# Patient Record
Sex: FEMALE | Race: WHITE | NOT HISPANIC OR LATINO | Employment: OTHER | URBAN - METROPOLITAN AREA
[De-identification: names, ages, dates, MRNs, and addresses within clinical notes are randomized per-mention and may not be internally consistent; named-entity substitution may affect disease eponyms.]

---

## 2023-04-18 ENCOUNTER — TELEPHONE (OUTPATIENT)
Dept: PRIMARY CARE | Facility: CLINIC | Age: 73
End: 2023-04-18
Payer: MEDICARE

## 2023-04-18 ENCOUNTER — CLINICAL SUPPORT (OUTPATIENT)
Dept: PRIMARY CARE | Facility: CLINIC | Age: 73
End: 2023-04-18
Payer: COMMERCIAL

## 2023-04-18 DIAGNOSIS — Z11.52 ENCOUNTER FOR SCREENING FOR COVID-19: Primary | ICD-10-CM

## 2023-04-18 DIAGNOSIS — Z00.00 HEALTHCARE MAINTENANCE: Primary | ICD-10-CM

## 2023-04-18 PROCEDURE — U0004 COV-19 TEST NON-CDC HGH THRU: HCPCS

## 2023-04-18 PROCEDURE — U0005 INFEC AGEN DETEC AMPLI PROBE: HCPCS

## 2023-04-18 RX ORDER — LEVOTHYROXINE SODIUM 150 UG/1
150 TABLET ORAL DAILY
COMMUNITY
End: 2023-09-07 | Stop reason: SDUPTHER

## 2023-04-18 RX ORDER — FLUTICASONE PROPIONATE 50 MCG
2 SPRAY, SUSPENSION (ML) NASAL DAILY
COMMUNITY
Start: 2022-05-15 | End: 2024-03-14 | Stop reason: WASHOUT

## 2023-04-18 RX ORDER — LISINOPRIL 10 MG/1
10 TABLET ORAL DAILY
COMMUNITY
End: 2023-09-07 | Stop reason: SDUPTHER

## 2023-04-18 RX ORDER — LANSOPRAZOLE 30 MG/1
1 CAPSULE, DELAYED RELEASE ORAL DAILY
COMMUNITY
Start: 2023-02-18 | End: 2024-03-14 | Stop reason: WASHOUT

## 2023-04-18 RX ORDER — VIT C/E/ZN/COPPR/LUTEIN/ZEAXAN 250MG-90MG
CAPSULE ORAL
COMMUNITY
Start: 2021-04-06 | End: 2024-03-14 | Stop reason: WASHOUT

## 2023-04-18 RX ORDER — ATORVASTATIN CALCIUM 20 MG/1
20 TABLET, FILM COATED ORAL DAILY
COMMUNITY
End: 2023-06-08

## 2023-04-18 NOTE — TELEPHONE ENCOUNTER
Patient communicated to medical assistant that she is to be scheduled to start chemotherapy later this week, however requires COVID-19 testing prior to initiating chemotherapy.  COVID-19 screening test ordered.  We will follow-up when resulted.

## 2023-04-19 LAB — SARS-COV-2 RESULT: NOT DETECTED

## 2023-05-01 ENCOUNTER — PATIENT OUTREACH (OUTPATIENT)
Dept: CARE COORDINATION | Facility: CLINIC | Age: 73
End: 2023-05-01
Payer: MEDICARE

## 2023-06-05 ENCOUNTER — PATIENT OUTREACH (OUTPATIENT)
Dept: CARE COORDINATION | Facility: CLINIC | Age: 73
End: 2023-06-05
Payer: MEDICARE

## 2023-06-08 DIAGNOSIS — E78.5 HYPERLIPIDEMIA, UNSPECIFIED HYPERLIPIDEMIA TYPE: Primary | ICD-10-CM

## 2023-06-08 RX ORDER — ATORVASTATIN CALCIUM 20 MG/1
TABLET, FILM COATED ORAL
Qty: 90 TABLET | Refills: 0 | Status: SHIPPED | OUTPATIENT
Start: 2023-06-08 | End: 2023-09-07 | Stop reason: SDUPTHER

## 2023-06-13 ENCOUNTER — PATIENT OUTREACH (OUTPATIENT)
Dept: CARE COORDINATION | Facility: CLINIC | Age: 73
End: 2023-06-13
Payer: MEDICARE

## 2023-09-07 ENCOUNTER — TELEPHONE (OUTPATIENT)
Dept: PRIMARY CARE | Facility: CLINIC | Age: 73
End: 2023-09-07
Payer: MEDICARE

## 2023-09-07 DIAGNOSIS — E03.9 HYPOTHYROIDISM, UNSPECIFIED TYPE: Primary | ICD-10-CM

## 2023-09-07 DIAGNOSIS — I10 HTN (HYPERTENSION), BENIGN: ICD-10-CM

## 2023-09-07 DIAGNOSIS — E78.5 HYPERLIPIDEMIA, UNSPECIFIED HYPERLIPIDEMIA TYPE: ICD-10-CM

## 2023-09-07 RX ORDER — ATORVASTATIN CALCIUM 20 MG/1
20 TABLET, FILM COATED ORAL DAILY
Qty: 90 TABLET | Refills: 1 | Status: SHIPPED | OUTPATIENT
Start: 2023-09-07 | End: 2024-03-01

## 2023-09-07 RX ORDER — CARBOXYMETHYLCELLULOSE SODIUM 10 MG/ML
2 GEL OPHTHALMIC 4 TIMES DAILY PRN
COMMUNITY
End: 2024-03-14 | Stop reason: WASHOUT

## 2023-09-07 RX ORDER — LISINOPRIL 10 MG/1
10 TABLET ORAL DAILY
Qty: 90 TABLET | Refills: 3 | Status: SHIPPED | OUTPATIENT
Start: 2023-09-07 | End: 2024-09-06

## 2023-09-07 RX ORDER — PROCHLORPERAZINE MALEATE 10 MG
TABLET ORAL
COMMUNITY
Start: 2023-05-17 | End: 2024-03-14 | Stop reason: WASHOUT

## 2023-09-07 RX ORDER — DOCUSATE SODIUM 100 MG/1
100 CAPSULE, LIQUID FILLED ORAL 2 TIMES DAILY
COMMUNITY
Start: 2023-05-04 | End: 2024-03-14 | Stop reason: WASHOUT

## 2023-09-07 RX ORDER — LEVOTHYROXINE SODIUM 150 UG/1
150 TABLET ORAL DAILY
Qty: 90 TABLET | Refills: 3 | Status: SHIPPED | OUTPATIENT
Start: 2023-09-07 | End: 2024-09-06

## 2023-09-28 ENCOUNTER — DOCUMENTATION (OUTPATIENT)
Dept: HEMATOLOGY/ONCOLOGY | Facility: HOSPITAL | Age: 73
End: 2023-09-28
Payer: MEDICARE

## 2023-10-04 NOTE — PROGRESS NOTES
"Late entry for 9/28/2023 - Unable to enter in decommissioned EMR.    Treatment Synopsis:    XRT both orbits 3/2/2023 - 3/17/2023.  Prednisone 20-60 mg 12/2022-3/2023, completed tapering on 3/31/2023.  R-MTX C1 on 4/21/2023, C2 on 5/12/2023, C3 on 6/2/2023        History of Present Illness:      ID Statement:    bre is a () day old bre        Chief Complaint: Decreased vision in the left eye.   Interval History:    Patient had no prior history of visual disturbance. In Nov 2022 she work up with swelling in the left eye. By December  the symptoms worsened, to the degree her vision in the left eye decreased significantly, and she could not move the left eye. She presented to ED on DEC 22, 2022. After MRI demonstrated \"evidence of extensive abnormal signal and enhancement infiltrating  the postseptal intraconal and extraconal left orbit\" she underwent a biopsy with Dr. Jackson Silva on DEC 24, 2022. In addition, after the biopsy she started prednisone 60 mg daily, resulting in symptomatic relief by day 3. While awaiting the pathology  results, her prednisone dose was tapered to 40 mg, then more recently to 20 mg daily. At 20 mg dose of prednisone the patient noticed the left eye symptoms (drooping, bulging) recurred to a mild degree.      The patient was seen by me on Jan 27, 2023 for Lymphoma, MALToma, involving the left orbits. Since then she has had more tests, including  MRI, labs, LP. In addition, she was referred to [facility] with Dr. Marbin Webster. While XRT is still pending, the prednisone dose was  increased back to 60 mg, which again improved the double vision of the left eye and swelling. XRT was initiated on 3/2/2023 daily, and finished on 3/17/2023. She feels well with both eye. Prednisone was tapered since then to 10 mg daily, and the last  dose was on 3/31/2023. She subsequently was admitted on 4/21 to receive high dose MTX + ritux, and tolerated it well. subsequently, she received C2 on 5/12/2023 and C3 " on 6/2/2023.    Today the patient feels well. Previously gained wt while on chemo, now losing it and feels well. No symptoms in the eyes. She has no headache. However, she experienced dizziness when changing positions, and this has also improved since completing chemo. No cough. NO fever. No nausea vomiting diarrhea. Urine output as usual.        Review of Systems:   Review of Systems:    See HPI. Elsewhere negative or WNL.       ·  System Review All other systems have been reviewed and are negative for complaint.      · Constitutional NEGATIVE: Fever, Chills, Anorexia, Weight Loss, Malaise     Comments Fatigue.      · Eyes NEGATIVE: Blurry Vision, Drainage, Diploplia, Redness, Vision Loss/ Change     Comments Previous symptoms started in Nov 2022, now resolved after prednisone and XRT.      · ENMT NEGATIVE: Nasal Discharge, Nasal Congestion, Ear Pain, Mouth Pain, Throat Pain     Comments Ear plugging.      · Respiratory NEGATIVE: Dry Cough, Productive Cough, Hemoptysis, Wheezing, Shortness of Breath      ·  Cardiology POSITIVE: Dyspnea on Exertion     NEGATIVE: Chest Pain, Orthopnea, Palpitations, Syncope      · Gastrointestinal NEGATIVE: Abdominal Pain, Constipation, Diarrhea, Nausea, Vomiting      · Genitourinary NEGATIVE: Discharge, Dysuria, Flank Pain, Frequency, Hematuria      ·  Musculoskeletal POSITIVE: Weakness     NEGATIVE: Decreased ROM, Pain, Swelling, Stiffness      ·  Neurological POSITIVE: Dizziness     NEGATIVE: Confusion, Headache, Seizures, Syncope     Comments No falls as she learned to rise up slowly.      · Psychiatric NEGATIVE: Mood Changes, Anxiety, Hallucinations, Sleep Changes, Suicidal Ideas      · Skin NEGATIVE: Mass, Pain, Pruritus, Rash, Ulcer      · Endocrine NEGATIVE: Heat Intolerance, Cold Intolerance, Sweat, Polyuria, Thirst      · Hematologic/Lymph NEGATIVE: Anemia, Bruising, Easy Bleeding, Night Sweats, Petechiae            Allergies and Intolerances:       Allergies:          codeine: Drug, Unknown, Active     Outpatient Medication Profile:  * Patient Currently Takes Medications as of 2023 19:53 documented in Structured Notes         potassium chloride 20 mEq oral tablet, extended  release: 1 tab(s) orally 2 times a day, Start Date: 2023         saliva substitutes oral solution: 15 milliliter(s) orally 4 times a day,  Start Date: 2023         prochlorperazine 10 mg oral tablet: 1 tab(s) orally every 6 hours, As  Needed - for nausea, Start Date: 17-May-2023         ondansetron 8 mg oral tablet: 1 tab(s) orally every 8 hours, As Needed  - for nausea, Start Date: 17-May-2023         Vitamin D3 25 mcg (1000 intl units) oral tablet: 1 tab(s) orally once  a day         lisinopril 10 mg oral tablet: 1 tab(s) orally once a day         atorvastatin 20 mg oral tablet: 1 tab(s) orally once a day         levothyroxine 150 mcg (0.15 mg) oral capsule: 1 tab(s) orally once a  day         ocular lubricant ophthalmic solution: 1 drop(s) to each affected eye  4 times a day, As Needed - for dry eyes         polyethylene glycol 3350 oral powder for reconstitution: 17 gram(s) orally  once a day, As Needed -for constipation              Medical History:         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, Status: Active         Hyperlipidemia: ICD-10: E78.5, Status: Active       Surg History:         Cataract: ICD-10: H26.9, Status: Active         History of partial thyroidectomy: ICD-10: E89.0, Status:  Active         History of knee surgery: ICD-10: Z98.890, Status: Active         History of  section: ICD-10: Z98.891, Status: Active        Social History:   Social Substance History:  ·  Smoking Status former smoker (1)            Performance:   ECOG Performance Status: 0 Fully Active         Vitals and Measurements:   Vitals: Temp: 36.8  HR: 104  RR: 18  BP: 118/73   SPO2%:  98   Measurements: HT(cm): 161  WT(kg): 92.7  BSA: 2.03   BMI:  35.7       Physical Exam:      Constitutional: Well developed, awake/alert/oriented  x3, no distress, alert and cooperative   Eyes: PERRL, EOMI, clear sclera   ENMT: mucous membranes moist, no apparent injury,  no lesions seen   Head/Neck: Neck supple, no apparent injury, thyroid  without mass or tenderness, No JVD, trachea midline, no bruits   Respiratory/Thorax: Patent airways, CTAB, normal  breath sounds with good chest expansion, thorax symmetric   Cardiovascular: Regular, rate and rhythm, no murmurs,  2+ equal pulses of the extremities, normal S 1and S 2   Gastrointestinal: Nondistended, soft, non-tender,  no rebound tenderness or guarding, no masses palpable, no organomegaly, +BS, no bruits   Genitourinary: No Discharge, vesicles or other abnormalities   Musculoskeletal: ROM intact, no joint swelling, normal  strength   Extremities: normal extremities, no cyanosis edema,  contusions or wounds, no clubbing   Neurological: alert and oriented x3, intact senses,  motor, response and reflexes, normal strength   Breast: No masses, tenderness, no discharge or discoloration   Lymphatic: No significant lymphadenopathy   Psychological: Appropriate mood and behavior   Skin: Warm and dry, no lesions, no rashes         Lab Results:     ·  Results        CBC date/time       WBC     HGB     HCT     PLT     Neut      09-Jun-2023 15:17   7.2     13.2    40.0    200     4.75     BMP date/time       NA              K               CL              CO2             BUN             CREAT             09-Jun-2023 15:17   136             4.0             103             N/A             21              1.01     Hepatic date/time   T Pro   T Bili  AST     ALT     ALKP    ALB       02-Jun-2023 16:22   6.1(L)  0.8     N/A     19      140(H)   3.9     LDH date/time       LDH     09-Jun-2023 15:17   N/A     Radiology Result:     ·  Results     FINAL REPORT  Interpreted by: MISTI VALERIO FREDERICK, MD  06/26/23 11:49  Facility: [facility]  [place]     MRN: [id number]  Patient Name: ADARSH CHAIDEZ     STUDY:  MR ORBITS WO/W;  6/26/2023 10:35 am     INDICATION:  MALT; progression scan  C88.4: Mucosa-associated lymphoid tissue  (MALT) lymphoma of orbit.     COMPARISON:  December 2022.     ACCESSION NUMBER(S):  [id number]     ORDERING CLINICIAN:  JASON GURROLA     TECHNIQUE:  The orbits and paranasal sinuses were studied in the sagittal, axial  and coronal plane utilizing T1 and T2 weighted images both before and  following intravenous injection of contrast     FINDINGS:  * Persistent but decreased enhancement within the intraconal fat of  the left orbit  *Persistent slight thickening and enhancement of the rectus muscles  *The lacrimal glands are normal  *Soft tissue thickening and enhancement in the left pterygoid  palatine fissure have decreased in the interval since the previous  exam  *Slight thickening and enhancement remain in the anterior portion of  the left cavernous sinus  *The right orbit remains normal  *Increased mucoperiosteal thickening and enhancement in the ethmoid  air cells bilaterally  *No abnormal meningeal enhancement     IMPRESSION:  * Persistent but decreased soft tissue thickening and enhancement in  the left orbit with involvement of the muscle cone and extraocular  muscles as well as the orbital apex and left cavernous sinus.  * THIS EXAMINATION WAS INTERPRETED AT [facility]     Transcribed By: Interface, User  Electronically Signed By: MISTI VALERIO FREDERICK   06/26/23 11:49                    2023/2/9 MRI spine:  Within the L2 and L4 vertebral bodies there are areas of T1 and T2  hypointensity with increased STIR signal and enhancement on  postcontrast images worrisome for a bone marrow replacing lesion such  as a metastatic lesions. There is no loss of vertebral body height,  osseous retropulsion into the spinal canal or spinal canal stenosis.     Area of similar signal abnormality within the inferior left sacrum  and left iliac  bone also worrisome for a bone marrow replacing lesion  such as a metastatic lesions.     Mild degenerative changes without significant spinal canal or neural  foraminal stenosis.     No abnormal signal or enhancement within the distal cord or nerve  roots.        2022/12/23 MRI of orbits  IMPRESSION:  There is again evidence of extensive abnormal signal and enhancement  infiltrating the postseptal intraconal and extraconal left orbit with  posterior intracranial extension through the left orbital fissure  into the region of the left cavernous sinus as well as caudal  extension through left pterygopalatine fossa with more ill-defined  abnormal signal enhancement extending laterally into the left retro  maxillary fat pad. There is asymmetric left-sided exophthalmos with  straightening of the left optic nerve and globe tenting. There is  extrinsic compression upon the left optic nerve/optic nerve sheath.  There is abnormal enhancement along the left optic disc. There is  asymmetric signal abnormality and enhancement infiltrating the left  preseptal periorbital soft tissues. There is additional abnormal  signal and enhancement infiltrating the right pterygopalatine fossa  with cranial extension into the right orbital apex along the inferior  right orbital fissure as well as along the margins of the postseptal  right orbit most pronounced inferior laterally. There is  posterolateral extension in an extracranial location infiltrating and  surrounding the right  space as well as infiltrating the  right retro maxillary fat pad. There is additional abnormal  inhomogeneous bone marrow signal infiltrating the central skull base  including the pterygoid bones right greater than left, clivus, and  right petrous bone. There is additional abnormal intracranial  enhancement surrounding the right petrous apex and partial effacing  Meckel's cave on the right with asymmetric abnormal enhancement  extending inferior  laterally in a right parasellar location through  the region of the right foramen ovale. The abnormal extra-axial  enhancement surrounding the right petrous apex extends posteriorly  and caudally into the right internal auditory canal. While  nonspecific, the constellation of MRI findings is concerning for a  extensive infiltrative neoplastic process such as lymphoma other  etiology such as a underlying granulomatous process or infectious or  noninfectious inflammatory process can not be completely excluded.     There is a discrete partially calcified extra-axial dural-based  lesion adjacent to the lateral left occipital lobe measuring  approximately 9 mm by 4 mm in transaxial dimensions as seen on  high-resolution axial post gadolinium T1 weighted images through the  orbits slice 7 of 26 by 11 mm in craniocaudal dimension as seen on  coronal post gadolinium volumetric T1 slice 53 of 200. While  nonspecific, possible considerations would include a partially  calcified meningioma among others.              Pathology Results:     ·  Results     A.  LEFT ORBITAL MASS, BIOPSY:  -- FIBROADIPOSE TISSUE WITH SLIGHTLY ATYPICAL LYMPHOID INFILTRATE, SEE NOTE.     NOTE: there is no increase in IgG4-positive plasma cells. No granulomas are  identified. Molecular study for B-cell clonality is ordered to evaluate for the  possibility of a B-cell lymphoproliferative disorder, and the results reported  in an addendum. POSITIVE. CLONAL POPULATION DETECTED in the tested sample.  Addendum Diagnosis  A.  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 Clinical correlation is suggested.     This case was discussed at [facility]ematopathology consensus conference on  01/18/2023, and the overall diagnosis  was concurred.     CSF on 2/13/2023: no evidence of lymphoma     BM on 2/13/2023: no evidence of lymphoma     Assessment and Plan:      Assessment and Plan:   Assessment:    #Lymphoma, MALToma  Thoroughly reviewed all available data at disease presentation in Feb 2023. Reviewed independently with Neuro-radiology. Tumor involvement  around the orbital space is quite extensive. The dura is possible also related. There is therefore a risk of CNS involvement / relapse. The stage is incomplete, and will require more body CT, spine MRI, bone marrow biopsy, and CSF analysis. However, these  results may be compromised by the treatment using steroid since end of Dec 2022.   The immediate plan is to prevent loss of vision in the left eye. In light of mild relapse of symptoms in the left eye, will increase prednisone back to 60 mg, while awaiting [facility] input on XRT of both orbits.   Other staging work up include: body CT, spine MRI, bone marrow biopsy, and CSF analysis. The results  do not show evidence of CSF or leptomeningeal involvement. MRI of the spine suggest possible bone marrow involvement. Bone marrow biopsy did not show evidence of lymphoma     Systemic therapy  is indicated due to significant involvement of the both orbits and surrounding tissues. Details described below.   The response to XRT and R-MTX has been encouraging. Results of Persistent but decreased soft tissue thickening and enhancement in the left orbit and surrounding tissues is likely indicative  of treatment response, and reaction and repair. However, persistent lymphoma cannot be ruled out. Unfortunately, neither MRI and PET/CT will be very sensitive to detect it. Will need to monitor closely, will likely have MRI orbits q3mon for the first 12 months.     -Results of MRI orbits shows: Findings consistent with left orbital lymphoma are slightly decreased from the prior study dated 06/26/2023. Question focal increased signal within the  intracanalicular segment of the left optic nerve versus volume averaging. Attention on follow-up exams.  -The above is more consistent with post-treatment effects. Will continue to monitor closely. MRI brain in 3 mon, then MRI orbits in another 3 mon.     #Treatment   Will benefit from high dose methotrexate and rituximab. The goal is to prevent CNS relapse and local (orbital) relapse.   Discussed the AEs of both drugs, which are numerous. In particular, discussed SAEs such as PML due to IRAIS viral encephalitis, mutositis, TAI, or even rarely death.   Patient understood the benefits and risks of the treatment and consented.   She is non-reactive to HBV core Ab. Therefore the risk of HBV reactivation is low.    She subsequently was admitted on 4/21 and 5/12 to receive high dose MTX + ritux, and tolerated both cycles well. C3 on 6/2 is the last planned chemo. Treatment is completed.      Plan:    RTC with JOE/MD in 3 mon.   MRI brain in 3 mon, then MRI orbits in another 3 mon.

## 2023-12-20 PROBLEM — H61.21 IMPACTED CERUMEN OF RIGHT EAR: Status: ACTIVE | Noted: 2023-12-20

## 2023-12-20 PROBLEM — H90.A31 MIXED CONDUCTIVE AND SENSORINEURAL HEARING LOSS, UNILATERAL, RIGHT EAR WITH RESTRICTED HEARING ON THE CONTRALATERAL SIDE: Status: ACTIVE | Noted: 2023-12-20

## 2023-12-20 PROBLEM — T45.1X5A CHEMOTHERAPY INDUCED NAUSEA AND VOMITING: Status: ACTIVE | Noted: 2023-12-20

## 2023-12-20 PROBLEM — H40.059 OCULAR HYPERTENSION: Status: ACTIVE | Noted: 2023-12-20

## 2023-12-20 PROBLEM — H69.91 DYSFUNCTION OF RIGHT EUSTACHIAN TUBE: Status: ACTIVE | Noted: 2023-12-20

## 2023-12-20 PROBLEM — E03.9 HYPOTHYROID: Status: ACTIVE | Noted: 2023-12-20

## 2023-12-20 PROBLEM — H90.3 SENSORINEURAL HEARING LOSS (SNHL) OF BOTH EARS: Status: ACTIVE | Noted: 2023-12-20
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
## 2023-12-20 PROBLEM — C85.99: Status: ACTIVE | Noted: 2023-12-20

## 2023-12-20 PROBLEM — E78.5 HYPERLIPIDEMIA: Status: ACTIVE | Noted: 2023-12-20

## 2023-12-20 PROBLEM — E04.2 MULTINODULAR GOITER: Status: ACTIVE | Noted: 2023-12-20

## 2023-12-20 PROBLEM — R60.9 PAROTID SWELLING: Status: ACTIVE | Noted: 2023-12-20

## 2023-12-20 PROBLEM — H65.91 RIGHT OTITIS MEDIA WITH EFFUSION: Status: ACTIVE | Noted: 2023-12-20

## 2023-12-20 PROBLEM — R11.2 CHEMOTHERAPY INDUCED NAUSEA AND VOMITING: Status: ACTIVE | Noted: 2023-12-20

## 2023-12-20 PROBLEM — H57.12 PAIN AROUND LEFT EYE: Status: ACTIVE | Noted: 2023-12-20

## 2023-12-20 PROBLEM — I10 HYPERTENSION: Status: ACTIVE | Noted: 2023-12-20

## 2023-12-20 PROBLEM — H05.20 PROPTOSIS: Status: ACTIVE | Noted: 2023-12-20

## 2023-12-20 PROBLEM — J30.2 SEASONAL ALLERGIC RHINITIS: Status: ACTIVE | Noted: 2023-12-20

## 2023-12-20 PROBLEM — M54.2 NECK PAIN: Status: ACTIVE | Noted: 2023-12-20

## 2023-12-20 PROBLEM — R29.6 FALLS: Status: ACTIVE | Noted: 2023-12-20

## 2023-12-20 PROBLEM — H91.90 HEARING LOSS: Status: ACTIVE | Noted: 2023-12-20

## 2023-12-20 RX ORDER — POTASSIUM CHLORIDE 750 MG/1
TABLET, EXTENDED RELEASE ORAL
COMMUNITY
Start: 2023-05-17 | End: 2024-03-14 | Stop reason: WASHOUT

## 2023-12-20 RX ORDER — POLYETHYLENE GLYCOL 3350 17 G/17G
17 POWDER, FOR SOLUTION ORAL
COMMUNITY
End: 2024-03-14 | Stop reason: WASHOUT

## 2023-12-20 RX ORDER — POTASSIUM CHLORIDE 20 MEQ/1
TABLET, EXTENDED RELEASE ORAL
COMMUNITY
Start: 2023-06-07 | End: 2024-03-14 | Stop reason: WASHOUT

## 2023-12-20 RX ORDER — LEUCOVORIN CALCIUM 25 MG/1
TABLET ORAL
COMMUNITY
Start: 2023-05-17 | End: 2024-03-14 | Stop reason: WASHOUT

## 2023-12-22 ENCOUNTER — LAB (OUTPATIENT)
Dept: LAB | Facility: LAB | Age: 73
End: 2023-12-22
Payer: MEDICARE

## 2023-12-22 ENCOUNTER — HOSPITAL ENCOUNTER (OUTPATIENT)
Dept: RADIOLOGY | Facility: HOSPITAL | Age: 73
Discharge: HOME | End: 2023-12-22
Payer: MEDICARE

## 2023-12-22 DIAGNOSIS — C85.99 NON-HODGKIN LYMPHOMA, UNSPECIFIED, EXTRANODAL AND SOLID ORGAN SITES (MULTI): ICD-10-CM

## 2023-12-22 DIAGNOSIS — C85.99 NON-HODGKIN LYMPHOMA, UNSPECIFIED, EXTRANODAL AND SOLID ORGAN SITES (MULTI): Primary | ICD-10-CM

## 2023-12-22 LAB
ALBUMIN SERPL BCP-MCNC: 4.1 G/DL (ref 3.4–5)
ALP SERPL-CCNC: 118 U/L (ref 33–136)
ALT SERPL W P-5'-P-CCNC: 10 U/L (ref 7–45)
ANION GAP SERPL CALC-SCNC: 11 MMOL/L (ref 10–20)
AST SERPL W P-5'-P-CCNC: 12 U/L (ref 9–39)
BASOPHILS # BLD AUTO: 0.03 X10*3/UL (ref 0–0.1)
BASOPHILS NFR BLD AUTO: 0.4 %
BILIRUB SERPL-MCNC: 0.5 MG/DL (ref 0–1.2)
BUN SERPL-MCNC: 19 MG/DL (ref 6–23)
CALCIUM SERPL-MCNC: 8.9 MG/DL (ref 8.6–10.3)
CHLORIDE SERPL-SCNC: 105 MMOL/L (ref 98–107)
CO2 SERPL-SCNC: 28 MMOL/L (ref 21–32)
CREAT SERPL-MCNC: 0.76 MG/DL (ref 0.5–1.05)
EOSINOPHIL # BLD AUTO: 0.38 X10*3/UL (ref 0–0.4)
EOSINOPHIL NFR BLD AUTO: 5.2 %
ERYTHROCYTE [DISTWIDTH] IN BLOOD BY AUTOMATED COUNT: 13.7 % (ref 11.5–14.5)
GFR SERPL CREATININE-BSD FRML MDRD: 82 ML/MIN/1.73M*2
GLUCOSE SERPL-MCNC: 120 MG/DL (ref 74–99)
HCT VFR BLD AUTO: 41.3 % (ref 36–46)
HGB BLD-MCNC: 13.2 G/DL (ref 12–16)
IMM GRANULOCYTES # BLD AUTO: 0.02 X10*3/UL (ref 0–0.5)
IMM GRANULOCYTES NFR BLD AUTO: 0.3 % (ref 0–0.9)
LDH SERPL L TO P-CCNC: 136 U/L (ref 84–246)
LYMPHOCYTES # BLD AUTO: 1.44 X10*3/UL (ref 0.8–3)
LYMPHOCYTES NFR BLD AUTO: 19.8 %
MAGNESIUM SERPL-MCNC: 1.93 MG/DL (ref 1.6–2.4)
MCH RBC QN AUTO: 27.4 PG (ref 26–34)
MCHC RBC AUTO-ENTMCNC: 32 G/DL (ref 32–36)
MCV RBC AUTO: 86 FL (ref 80–100)
MONOCYTES # BLD AUTO: 0.56 X10*3/UL (ref 0.05–0.8)
MONOCYTES NFR BLD AUTO: 7.7 %
NEUTROPHILS # BLD AUTO: 4.84 X10*3/UL (ref 1.6–5.5)
NEUTROPHILS NFR BLD AUTO: 66.6 %
NRBC BLD-RTO: 0 /100 WBCS (ref 0–0)
PLATELET # BLD AUTO: 265 X10*3/UL (ref 150–450)
POTASSIUM SERPL-SCNC: 3.9 MMOL/L (ref 3.5–5.3)
PROT SERPL-MCNC: 6.3 G/DL (ref 6.4–8.2)
RBC # BLD AUTO: 4.81 X10*6/UL (ref 4–5.2)
SODIUM SERPL-SCNC: 140 MMOL/L (ref 136–145)
URATE SERPL-MCNC: 5.4 MG/DL (ref 2.3–6.7)
WBC # BLD AUTO: 7.3 X10*3/UL (ref 4.4–11.3)

## 2023-12-22 PROCEDURE — 36415 COLL VENOUS BLD VENIPUNCTURE: CPT

## 2023-12-22 PROCEDURE — 83735 ASSAY OF MAGNESIUM: CPT

## 2023-12-22 PROCEDURE — 83615 LACTATE (LD) (LDH) ENZYME: CPT

## 2023-12-22 PROCEDURE — A9575 INJ GADOTERATE MEGLUMI 0.1ML: HCPCS | Performed by: INTERNAL MEDICINE

## 2023-12-22 PROCEDURE — 70553 MRI BRAIN STEM W/O & W/DYE: CPT

## 2023-12-22 PROCEDURE — 80053 COMPREHEN METABOLIC PANEL: CPT

## 2023-12-22 PROCEDURE — 85025 COMPLETE CBC W/AUTO DIFF WBC: CPT

## 2023-12-22 PROCEDURE — 84550 ASSAY OF BLOOD/URIC ACID: CPT

## 2023-12-22 PROCEDURE — 70553 MRI BRAIN STEM W/O & W/DYE: CPT | Performed by: RADIOLOGY

## 2023-12-22 PROCEDURE — 2500000004 HC RX 250 GENERAL PHARMACY W/ HCPCS (ALT 636 FOR OP/ED): Performed by: INTERNAL MEDICINE

## 2023-12-22 RX ORDER — GADOTERATE MEGLUMINE 376.9 MG/ML
17 INJECTION INTRAVENOUS
Status: COMPLETED | OUTPATIENT
Start: 2023-12-22 | End: 2023-12-22

## 2023-12-22 RX ADMIN — GADOTERATE MEGLUMINE 17 ML: 376.9 INJECTION INTRAVENOUS at 11:56

## 2023-12-28 ENCOUNTER — TELEMEDICINE (OUTPATIENT)
Dept: HEMATOLOGY/ONCOLOGY | Facility: HOSPITAL | Age: 73
End: 2023-12-28
Payer: MEDICARE

## 2023-12-28 DIAGNOSIS — C85.99 ORBITAL LYMPHOMA (MULTI): Primary | ICD-10-CM

## 2023-12-28 PROCEDURE — 99214 OFFICE O/P EST MOD 30 MIN: CPT | Performed by: NURSE PRACTITIONER

## 2024-01-09 ENCOUNTER — TELEPHONE (OUTPATIENT)
Dept: RADIATION ONCOLOGY | Facility: HOSPITAL | Age: 74
End: 2024-01-09
Payer: MEDICARE

## 2024-01-09 NOTE — TELEPHONE ENCOUNTER
Called pt. to remind of appointment on 1/10/2024 at 2:00 pm with Dr. Webster. Pt answered and confirmed  appointment.

## 2024-01-10 ENCOUNTER — HOSPITAL ENCOUNTER (OUTPATIENT)
Dept: RADIATION ONCOLOGY | Facility: HOSPITAL | Age: 74
Setting detail: RADIATION/ONCOLOGY SERIES
Discharge: HOME | End: 2024-01-10
Payer: MEDICARE

## 2024-01-10 VITALS
OXYGEN SATURATION: 99 % | DIASTOLIC BLOOD PRESSURE: 82 MMHG | HEART RATE: 93 BPM | WEIGHT: 182.54 LBS | HEIGHT: 63 IN | RESPIRATION RATE: 18 BRPM | BODY MASS INDEX: 32.34 KG/M2 | SYSTOLIC BLOOD PRESSURE: 131 MMHG | TEMPERATURE: 96.8 F

## 2024-01-10 DIAGNOSIS — C85.99 ORBITAL LYMPHOMA (MULTI): Primary | ICD-10-CM

## 2024-01-10 PROCEDURE — 99213 OFFICE O/P EST LOW 20 MIN: CPT | Performed by: STUDENT IN AN ORGANIZED HEALTH CARE EDUCATION/TRAINING PROGRAM

## 2024-01-10 ASSESSMENT — ENCOUNTER SYMPTOMS
OCCASIONAL FEELINGS OF UNSTEADINESS: 0
LOSS OF SENSATION IN FEET: 0
DEPRESSION: 0

## 2024-01-10 ASSESSMENT — COLUMBIA-SUICIDE SEVERITY RATING SCALE - C-SSRS
2. HAVE YOU ACTUALLY HAD ANY THOUGHTS OF KILLING YOURSELF?: NO
6. HAVE YOU EVER DONE ANYTHING, STARTED TO DO ANYTHING, OR PREPARED TO DO ANYTHING TO END YOUR LIFE?: NO
1. IN THE PAST MONTH, HAVE YOU WISHED YOU WERE DEAD OR WISHED YOU COULD GO TO SLEEP AND NOT WAKE UP?: NO

## 2024-01-10 ASSESSMENT — PATIENT HEALTH QUESTIONNAIRE - PHQ9
2. FEELING DOWN, DEPRESSED OR HOPELESS: NOT AT ALL
1. LITTLE INTEREST OR PLEASURE IN DOING THINGS: NOT AT ALL
SUM OF ALL RESPONSES TO PHQ9 QUESTIONS 1 AND 2: 0

## 2024-01-11 ASSESSMENT — ENCOUNTER SYMPTOMS
HEADACHES: 0
NUMBNESS: 0
RESPIRATORY NEGATIVE: 1
GASTROINTESTINAL NEGATIVE: 1
MUSCULOSKELETAL NEGATIVE: 1
CARDIOVASCULAR NEGATIVE: 1
CONSTITUTIONAL NEGATIVE: 1
DIZZINESS: 0
SPEECH DIFFICULTY: 0
PSYCHIATRIC NEGATIVE: 1

## 2024-01-11 NOTE — PROGRESS NOTES
Radiation Oncology Follow-Up    Patient Name:  Ирина Wesley  MRN:  [id number]  :  1950    Referring Provider: No ref. provider found  Primary Care Provider: Jonny Hahn DO  Care Team: Patient Care Team:  Jonny Hahn DO as PCP - General  Malachi Pate DO as PCP - [employer] PCP  Tonya Tolbert MD PhD as Consulting Physician (Hematology and Oncology)    Date of Service: 1/10/2024     SUBJECTIVE  History of Present Illness:   Ирина Wesley is a 73 y.o. female with MALToma of the orbits s/p high dose prednisone, radiation therapy to orbits and base of skull completed 3/2023, high dose methotrexate, and rituximab completed 2023. She presents for routine follow-up.     The patient endorses that her vision has improved greatly, is less blurry, and has returned to close to her baseline. However, she endorses persistent hearing loss that has not improved or worsened since completing radiation therapy. She denies new visual disturbances, double vision, numbness, or motor deficits.    Treatment Rendered:   Location : Orbits+CONSTANCE  Dates : 3/2/23 - 3/17/23  Number of Treatments : 12  Dose : 2400 cGy  Modality : Protons    Review of Systems:   Review of Systems   Constitutional: Negative.    HENT:   Positive for hearing loss.    Eyes:         Endorses improvement in vision bilaterally  Denies double vision   Respiratory: Negative.     Cardiovascular: Negative.    Gastrointestinal: Negative.    Musculoskeletal: Negative.    Neurological:  Negative for dizziness, headaches, numbness and speech difficulty.   Psychiatric/Behavioral: Negative.       The patient's current pain level was assessed.  They report currently having a pain of 0 out of 10.      Performance Status:   The Karnofsky performance scale today is 80, Normal activity with effort; some signs or symptoms of disease (ECOG equivalent 1).        OBJECTIVE  Vital Signs:  /82   Pulse 93   Temp 36 °C (96.8 °F) (Temporal)   Resp  "18   Ht 1.6 m (5' 3\")   Wt 82.8 kg (182 lb 8.7 oz)   SpO2 99%   BMI 32.34 kg/m²    Physical Exam:  Physical Exam  Constitutional:       General: She is not in acute distress.  HENT:      Ears:      Comments: Hearing grossly intact to voice; cannot hear finger rub     Mouth/Throat:      Mouth: Mucous membranes are moist.   Cardiovascular:      Rate and Rhythm: Normal rate.   Pulmonary:      Effort: Pulmonary effort is normal. No respiratory distress.   Abdominal:      General: Abdomen is flat.   Musculoskeletal:         General: Normal range of motion.   Skin:     General: Skin is warm and dry.   Neurological:      Mental Status: She is alert.      Motor: No weakness.      Coordination: Coordination normal.      Gait: Gait normal.      Comments: CN II: endorses some persistent blurry vision of left eye  CN II, IV, V, VI, VII intact  CN VIII: endorses mild hearing loss, cannot  hear finger rub  CN IX, XI, XII intact       MR BRAIN W AND WO IV CONTRAST; ;  12/22/2023 12:10 pm    COMPARISON:  MRI orbits from 09/18/2023. MRI brain from 04/06/2023.    FINDINGS:  Current study is not tailored to assess the orbits, noting this,  there is enhancing tissue located within the left intraconal fat  which is most consistent with lymphoma and/or treatment related  changes.      There is no acute intracranial hemorrhage or infarct. There is no  abnormal extra-axial fluid collection.      The ventricles, sulci, and basilar cisterns are normal in size,  shape, and configuration.      Stable 8 mm meningioma along the left temporal occipital lobes with  stable mass effect on the adjacent brain parenchyma but no  parenchymal signal abnormality.      There are few scattered foci of T2 hyperintensity within the cerebral  hemispheric white matter which are probably a sequela of chronic  small vessel ischemic changes, unchanged.      No new masses or lesions are seen within the intracranial compartment.      There is no striking signal " abnormality within the visualized osseous  structures to suggest lymphomatous involvement.      There is a small air-fluid level and mild mucosal thickening located  within the left maxillary sinus. There is mild mucosal thickening  located within the ethmoid air cells, frontal sinuses, and right  maxillary sinus. The mastoid air cells are clear.      IMPRESSION:  1. No striking evidence to suggest intracranial lymphomatous  involvement.      2. Stable meningioma along the left temporal occipital lobes with  associated mass effect.      3. Additional chronic intracranial findings as above.     ASSESSMENT:  Ирина Wesley is a 73 y.o. female with MALToma of the orbits s/p high dose prednisone, radiation therapy to orbits and base of skull completed 3/2023, high dose methotrexate, and rituximab completed 6/2023. She notes improvement in her visual deficits but endorses persistent hearing loss. Recent imaging suboptimal for assessment of orbits but does not show evidence of recurrence.    PLAN:  Follow-up in 3 months to assess MR orbits.    NCCN Guidelines were applicable to guide this patients treatment plan.  The patient was assessed and plan discussed with the attending radiation oncologist Dr. Webster.    Ute Lambert MD  PGY-2 Radiation Oncology Resident  On-call pager [phone]  Available on Epic Secure Chat

## 2024-01-20 NOTE — ADDENDUM NOTE
Encounter addended by: Marbin Webster MD on: 1/20/2024 4:28 PM   Actions taken: Level of Service modified, Clinical Note Signed

## 2024-02-29 DIAGNOSIS — E78.5 HYPERLIPIDEMIA, UNSPECIFIED HYPERLIPIDEMIA TYPE: ICD-10-CM

## 2024-03-01 RX ORDER — ATORVASTATIN CALCIUM 20 MG/1
20 TABLET, FILM COATED ORAL DAILY
Qty: 90 TABLET | Refills: 0 | Status: SHIPPED | OUTPATIENT
Start: 2024-03-01 | End: 2024-03-01

## 2024-03-03 DIAGNOSIS — E78.5 HYPERLIPIDEMIA, UNSPECIFIED HYPERLIPIDEMIA TYPE: ICD-10-CM

## 2024-03-04 RX ORDER — ATORVASTATIN CALCIUM 20 MG/1
20 TABLET, FILM COATED ORAL DAILY
Qty: 90 TABLET | Refills: 0 | Status: SHIPPED | OUTPATIENT
Start: 2024-03-04 | End: 2024-05-02 | Stop reason: DRUGHIGH

## 2024-03-11 ENCOUNTER — HOSPITAL ENCOUNTER (OUTPATIENT)
Dept: RADIOLOGY | Facility: HOSPITAL | Age: 74
Discharge: HOME | End: 2024-03-11
Payer: MEDICARE

## 2024-03-11 DIAGNOSIS — C85.99 NON-HODGKIN LYMPHOMA, UNSPECIFIED, EXTRANODAL AND SOLID ORGAN SITES (MULTI): ICD-10-CM

## 2024-03-11 PROCEDURE — A9575 INJ GADOTERATE MEGLUMI 0.1ML: HCPCS | Performed by: INTERNAL MEDICINE

## 2024-03-11 PROCEDURE — 70543 MRI ORBT/FAC/NCK W/O &W/DYE: CPT | Performed by: RADIOLOGY

## 2024-03-11 PROCEDURE — 2550000001 HC RX 255 CONTRASTS: Performed by: INTERNAL MEDICINE

## 2024-03-11 PROCEDURE — 70543 MRI ORBT/FAC/NCK W/O &W/DYE: CPT

## 2024-03-11 RX ORDER — GADOTERATE MEGLUMINE 376.9 MG/ML
17 INJECTION INTRAVENOUS
Status: COMPLETED | OUTPATIENT
Start: 2024-03-11 | End: 2024-03-11

## 2024-03-11 RX ADMIN — GADOTERATE MEGLUMINE 17 ML: 376.9 INJECTION INTRAVENOUS at 11:18

## 2024-03-14 ENCOUNTER — OFFICE VISIT (OUTPATIENT)
Dept: HEMATOLOGY/ONCOLOGY | Facility: HOSPITAL | Age: 74
End: 2024-03-14
Payer: MEDICARE

## 2024-03-14 VITALS
TEMPERATURE: 95.7 F | OXYGEN SATURATION: 98 % | HEART RATE: 95 BPM | RESPIRATION RATE: 16 BRPM | SYSTOLIC BLOOD PRESSURE: 125 MMHG | WEIGHT: 187.3 LBS | HEIGHT: 63 IN | DIASTOLIC BLOOD PRESSURE: 61 MMHG | BODY MASS INDEX: 33.19 KG/M2

## 2024-03-14 DIAGNOSIS — C85.89 MARGINAL ZONE LYMPHOMA OF EXTRANODAL AND SOLID ORGAN SITES (MULTI): Primary | ICD-10-CM

## 2024-03-14 PROCEDURE — 3078F DIAST BP <80 MM HG: CPT | Performed by: INTERNAL MEDICINE

## 2024-03-14 PROCEDURE — 3074F SYST BP LT 130 MM HG: CPT | Performed by: INTERNAL MEDICINE

## 2024-03-14 PROCEDURE — 1036F TOBACCO NON-USER: CPT | Performed by: INTERNAL MEDICINE

## 2024-03-14 PROCEDURE — 99214 OFFICE O/P EST MOD 30 MIN: CPT | Performed by: INTERNAL MEDICINE

## 2024-03-14 PROCEDURE — 3008F BODY MASS INDEX DOCD: CPT | Performed by: INTERNAL MEDICINE

## 2024-03-14 PROCEDURE — 1159F MED LIST DOCD IN RCRD: CPT | Performed by: INTERNAL MEDICINE

## 2024-03-14 PROCEDURE — 1126F AMNT PAIN NOTED NONE PRSNT: CPT | Performed by: INTERNAL MEDICINE

## 2024-03-14 ASSESSMENT — PAIN SCALES - GENERAL: PAINLEVEL_OUTOF10: 0-NO PAIN

## 2024-03-15 ENCOUNTER — OFFICE VISIT (OUTPATIENT)
Dept: PRIMARY CARE | Facility: CLINIC | Age: 74
End: 2024-03-15
Payer: MEDICARE

## 2024-03-15 VITALS
BODY MASS INDEX: 33.55 KG/M2 | DIASTOLIC BLOOD PRESSURE: 83 MMHG | OXYGEN SATURATION: 98 % | HEART RATE: 77 BPM | TEMPERATURE: 98.3 F | HEIGHT: 63 IN | WEIGHT: 189.38 LBS | SYSTOLIC BLOOD PRESSURE: 143 MMHG | RESPIRATION RATE: 16 BRPM

## 2024-03-15 DIAGNOSIS — Z87.891 PERSONAL HISTORY OF NICOTINE DEPENDENCE: ICD-10-CM

## 2024-03-15 DIAGNOSIS — E03.9 HYPOTHYROIDISM, UNSPECIFIED TYPE: ICD-10-CM

## 2024-03-15 DIAGNOSIS — J30.2 SEASONAL ALLERGIES: ICD-10-CM

## 2024-03-15 DIAGNOSIS — Z00.00 ANNUAL PHYSICAL EXAM: Primary | ICD-10-CM

## 2024-03-15 DIAGNOSIS — Z12.31 BREAST CANCER SCREENING BY MAMMOGRAM: ICD-10-CM

## 2024-03-15 PROCEDURE — G0439 PPPS, SUBSEQ VISIT: HCPCS

## 2024-03-15 PROCEDURE — 1036F TOBACCO NON-USER: CPT

## 2024-03-15 PROCEDURE — 3008F BODY MASS INDEX DOCD: CPT

## 2024-03-15 PROCEDURE — 1159F MED LIST DOCD IN RCRD: CPT

## 2024-03-15 PROCEDURE — 3079F DIAST BP 80-89 MM HG: CPT

## 2024-03-15 PROCEDURE — 1170F FXNL STATUS ASSESSED: CPT

## 2024-03-15 PROCEDURE — 3077F SYST BP >= 140 MM HG: CPT

## 2024-03-15 RX ORDER — MINERAL OIL
180 ENEMA (ML) RECTAL DAILY
Qty: 30 TABLET | Refills: 5 | Status: SHIPPED | OUTPATIENT
Start: 2024-03-15 | End: 2024-09-11

## 2024-03-15 SDOH — ECONOMIC STABILITY: HOUSING INSECURITY
IN THE LAST 12 MONTHS, WAS THERE A TIME WHEN YOU DID NOT HAVE A STEADY PLACE TO SLEEP OR SLEPT IN A SHELTER (INCLUDING NOW)?: NO

## 2024-03-15 SDOH — ECONOMIC STABILITY: INCOME INSECURITY: IN THE LAST 12 MONTHS, WAS THERE A TIME WHEN YOU WERE NOT ABLE TO PAY THE MORTGAGE OR RENT ON TIME?: NO

## 2024-03-15 SDOH — HEALTH STABILITY: PHYSICAL HEALTH: ON AVERAGE, HOW MANY DAYS PER WEEK DO YOU ENGAGE IN MODERATE TO STRENUOUS EXERCISE (LIKE A BRISK WALK)?: 0 DAYS

## 2024-03-15 SDOH — ECONOMIC STABILITY: GENERAL
WHICH OF THE FOLLOWING WOULD YOU LIKE TO GET CONNECTED TO IN ORDER TO RECEIVE A DISCOUNT OR FOR FREE? (CHOOSE ALL THAT APPLY): NONE OF THESE

## 2024-03-15 SDOH — ECONOMIC STABILITY: FOOD INSECURITY: WITHIN THE PAST 12 MONTHS, THE FOOD YOU BOUGHT JUST DIDN'T LAST AND YOU DIDN'T HAVE MONEY TO GET MORE.: NEVER TRUE

## 2024-03-15 SDOH — ECONOMIC STABILITY: FOOD INSECURITY: WITHIN THE PAST 12 MONTHS, YOU WORRIED THAT YOUR FOOD WOULD RUN OUT BEFORE YOU GOT MONEY TO BUY MORE.: NEVER TRUE

## 2024-03-15 SDOH — ECONOMIC STABILITY: GENERAL
WHICH OF THE FOLLOWING DO YOU KNOW HOW TO USE AND HAVE ACCESS TO EVERY DAY? (CHOOSE ALL THAT APPLY): DESKTOP COMPUTER, LAPTOP COMPUTER, OR TABLET WITH BROADBAND INTERNET CONNECTION;SMARTPHONE WITH CELLULAR DATA PLAN

## 2024-03-15 SDOH — HEALTH STABILITY: PHYSICAL HEALTH: ON AVERAGE, HOW MANY MINUTES DO YOU ENGAGE IN EXERCISE AT THIS LEVEL?: 0 MIN

## 2024-03-15 SDOH — ECONOMIC STABILITY: TRANSPORTATION INSECURITY
IN THE PAST 12 MONTHS, HAS LACK OF TRANSPORTATION KEPT YOU FROM MEETINGS, WORK, OR FROM GETTING THINGS NEEDED FOR DAILY LIVING?: NO

## 2024-03-15 SDOH — ECONOMIC STABILITY: TRANSPORTATION INSECURITY
IN THE PAST 12 MONTHS, HAS THE LACK OF TRANSPORTATION KEPT YOU FROM MEDICAL APPOINTMENTS OR FROM GETTING MEDICATIONS?: NO

## 2024-03-15 ASSESSMENT — ACTIVITIES OF DAILY LIVING (ADL)
MANAGING_FINANCES: INDEPENDENT
BATHING: INDEPENDENT
DRESSING: INDEPENDENT
DOING_HOUSEWORK: INDEPENDENT
GROCERY_SHOPPING: INDEPENDENT
TAKING_MEDICATION: INDEPENDENT

## 2024-03-15 ASSESSMENT — SOCIAL DETERMINANTS OF HEALTH (SDOH)
WITHIN THE LAST YEAR, HAVE YOU BEEN AFRAID OF YOUR PARTNER OR EX-PARTNER?: NO
HOW OFTEN DO YOU ATTENT MEETINGS OF THE CLUB OR ORGANIZATION YOU BELONG TO?: NEVER
HOW OFTEN DO YOU ATTEND CHURCH OR RELIGIOUS SERVICES?: NEVER
DO YOU BELONG TO ANY CLUBS OR ORGANIZATIONS SUCH AS CHURCH GROUPS UNIONS, FRATERNAL OR ATHLETIC GROUPS, OR SCHOOL GROUPS?: NO
WITHIN THE LAST YEAR, HAVE TO BEEN RAPED OR FORCED TO HAVE ANY KIND OF SEXUAL ACTIVITY BY YOUR PARTNER OR EX-PARTNER?: NO
IN THE PAST 12 MONTHS, HAS THE ELECTRIC, GAS, OIL, OR WATER COMPANY THREATENED TO SHUT OFF SERVICE IN YOUR HOME?: NO
HOW OFTEN DO YOU GET TOGETHER WITH FRIENDS OR RELATIVES?: ONCE A WEEK
WITHIN THE LAST YEAR, HAVE YOU BEEN HUMILIATED OR EMOTIONALLY ABUSED IN OTHER WAYS BY YOUR PARTNER OR EX-PARTNER?: NO
IN A TYPICAL WEEK, HOW MANY TIMES DO YOU TALK ON THE PHONE WITH FAMILY, FRIENDS, OR NEIGHBORS?: ONCE A WEEK
HOW HARD IS IT FOR YOU TO PAY FOR THE VERY BASICS LIKE FOOD, HOUSING, MEDICAL CARE, AND HEATING?: NOT HARD AT ALL
WITHIN THE LAST YEAR, HAVE YOU BEEN KICKED, HIT, SLAPPED, OR OTHERWISE PHYSICALLY HURT BY YOUR PARTNER OR EX-PARTNER?: NO

## 2024-03-15 ASSESSMENT — ENCOUNTER SYMPTOMS
NAUSEA: 0
LIGHT-HEADEDNESS: 0
OCCASIONAL FEELINGS OF UNSTEADINESS: 0
DEPRESSION: 0
DIZZINESS: 0
VOMITING: 0
SHORTNESS OF BREATH: 0
LOSS OF SENSATION IN FEET: 0
FEVER: 0

## 2024-03-15 ASSESSMENT — LIFESTYLE VARIABLES
HOW OFTEN DO YOU HAVE A DRINK CONTAINING ALCOHOL: NEVER
HOW MANY STANDARD DRINKS CONTAINING ALCOHOL DO YOU HAVE ON A TYPICAL DAY: PATIENT DOES NOT DRINK

## 2024-03-15 ASSESSMENT — PATIENT HEALTH QUESTIONNAIRE - PHQ9
SUM OF ALL RESPONSES TO PHQ9 QUESTIONS 1 AND 2: 0
1. LITTLE INTEREST OR PLEASURE IN DOING THINGS: NOT AT ALL
2. FEELING DOWN, DEPRESSED OR HOPELESS: NOT AT ALL

## 2024-03-15 NOTE — ASSESSMENT & PLAN NOTE
Today's discussion topics included, but were not limited to the following:.   Immunizations: Immunizations are not up to date. Pt to get shingles vaccine at pharmacy  Nutrition: pt to maintain healthy eating, eating a balanced diet and encourage proper nutrition.   Physical development and growth review included: participating in physical activity as tolerated while taking fall precautions including getting up slowly and moving with walker/cane if needed    Pt due for mammogram, annual screening CT, ordered. Pt denies wanting colonoscopy or cologuard. CBC/CMP ordered by oncologist, will get done with TSH, lipid panel when fasting.

## 2024-03-15 NOTE — ASSESSMENT & PLAN NOTE
Pt given allegra to help with possible allergy symptoms of ear fullness. Pt had follow up with ENT in past for issue without relief. Denied flonase today.

## 2024-03-15 NOTE — PROGRESS NOTES
"Subjective   Reason for Visit: Ирина Wesley is an 73 y.o. female here for a Medicare Wellness visit.          Reviewed all medications by prescribing practitioner or clinical pharmacist (such as prescriptions, OTCs, herbal therapies and supplements) and documented in the medical record.    Pt was being treated for orbital cancer lymphoma and is now in remission. She is taking her medication as prescribed. She notices she does have BPPV when she changing position fast so she changes it slowly. She is following up with a doctor for her ear. It has been constantly been plugged and did get checked by ENT for it in 2022 with Dr. Nails. She has had symptoms residual since. Nothing else going. Pt has not had colonoscopy and she is not open to getting one or a cologuard.    Sochx: denies smoking, drinking, sexual activity; former smoker quit in 2013; pt is not exercising much but is open to increasing  Famhx: maternal grandpa MI  Allergies: codeine        Patient Care Team:  Kenn Tubbs DO as PCP - General (Family Medicine)  Malachi Pate DO as PCP - [employer] PCP  Tonya Tolbert MD PhD as Consulting Physician (Hematology and Oncology)     Review of Systems   Constitutional:  Negative for fever.   Respiratory:  Negative for shortness of breath.    Cardiovascular:  Negative for chest pain.   Gastrointestinal:  Negative for nausea and vomiting.   Neurological:  Negative for dizziness and light-headedness.   All other systems reviewed and are negative.      Objective   Vitals:  /83 (BP Location: Right arm, Patient Position: Sitting)   Pulse 77   Temp 36.8 °C (98.3 °F) (Temporal)   Resp 16   Ht 1.6 m (5' 3\")   Wt 85.9 kg (189 lb 6 oz)   SpO2 98%   BMI 33.55 kg/m²       Physical Exam  Vitals reviewed.   Constitutional:       General: She is not in acute distress.     Appearance: Normal appearance. She is not toxic-appearing.   HENT:      Head: Normocephalic and atraumatic.      Nose: Nose " normal.   Eyes:      Extraocular Movements: Extraocular movements intact.   Cardiovascular:      Rate and Rhythm: Normal rate and regular rhythm.      Heart sounds: No murmur heard.     No friction rub. No gallop.   Pulmonary:      Effort: Pulmonary effort is normal. No respiratory distress.      Breath sounds: Normal breath sounds. No wheezing, rhonchi or rales.   Skin:     General: Skin is warm and dry.   Neurological:      General: No focal deficit present.      Mental Status: She is alert.   Psychiatric:         Mood and Affect: Mood normal.         Behavior: Behavior normal.         Assessment/Plan   Problem List Items Addressed This Visit       Hypothyroid    Relevant Orders    Tsh With Reflex To Free T4 If Abnormal    Personal history of nicotine dependence    Relevant Orders    CT lung screening low dose    Annual physical exam - Primary    Current Assessment & Plan     Today's discussion topics included, but were not limited to the following:.   Immunizations: Immunizations are not up to date. Pt to get shingles vaccine at pharmacy  Nutrition: pt to maintain healthy eating, eating a balanced diet and encourage proper nutrition.   Physical development and growth review included: participating in physical activity as tolerated while taking fall precautions including getting up slowly and moving with walker/cane if needed    Pt due for mammogram, annual screening CT, ordered. Pt denies wanting colonoscopy or cologuard. CBC/CMP ordered by oncologist, will get done with TSH, lipid panel when fasting.          Relevant Orders    BI mammo bilateral screening tomosynthesis    Lipid panel    Seasonal allergies    Current Assessment & Plan     Pt given allegra to help with possible allergy symptoms of ear fullness. Pt had follow up with ENT in past for issue without relief. Denied flonase today.         Relevant Medications    fexofenadine (Allegra) 180 mg tablet     Other Visit Diagnoses       Breast cancer  screening by mammogram        Relevant Orders    BI mammo bilateral screening tomosynthesis

## 2024-03-18 NOTE — PROGRESS NOTES
I reviewed the resident/fellow's documentation and discussed the patient with the resident/fellow. I agree with the resident/fellow's medical decision making as documented in the note.     Zacarias Grullon MD

## 2024-03-20 NOTE — PROGRESS NOTES
"Patient ID: Ирина Wesley is a 73 y.o. female.    Subjective    Patient had no prior history of visual disturbance. In Nov 2022 she work up with swelling in the left eye. By December  the symptoms worsened, to the degree her vision in the left eye decreased significantly, and she could not move the left eye. She presented to ED on DEC 22, 2022. After MRI demonstrated \"evidence of extensive abnormal signal and enhancement infiltrating  the postseptal intraconal and extraconal left orbit\" she underwent a biopsy with Dr. Jackson Silva on DEC 24, 2022. In addition, after the biopsy she started prednisone 60 mg daily, resulting in symptomatic relief by day 3. While awaiting the pathology  results, her prednisone dose was tapered to 40 mg, then more recently to 20 mg daily. At 20 mg dose of prednisone the patient noticed the left eye symptoms (drooping, bulging) recurred to a mild degree.      The patient was seen by me on Jan 27, 2023 for Lymphoma, extranodal marginal zone lymphoma, involving the left orbits. Since then she has had more tests, including  MRI, labs, LP. In addition, she was referred to [facility] with Dr. Marbin Webster. While XRT is still pending, the prednisone dose was  increased back to 60 mg, which again improved the double vision of the left eye and swelling. XRT was initiated on 3/2/2023 daily, and finished on 3/17/2023. She feels well with both eye. Prednisone was tapered since then to 10 mg daily, and the last  dose was on 3/31/2023. She subsequently was admitted on 4/21 to receive high dose MTX + ritux, and tolerated it well. subsequently, she received C2 on 5/12/2023 and C3 on 6/2/2023. She had MRI brain or orbits in June, September, December 2023 showing no active disease.     Today the patient feels well. Previously gained wt while on chemo, now losing it and feels well. No symptoms in the eyes. She has no headache. However, she experienced dizziness when changing positions, and this has also " "improved since completing chemo. No cough. NO fever. No nausea vomiting diarrhea. Urine output as usual.    Treatment Synopsis:   XRT both orbits 3/2/2023 - 3/17/2023.  Prednisone 20-60 mg 12/2022-3/2023, completed tapering on 3/31/2023.  R-MTX C1 on 4/21/2023, C2 on 5/12/2023, C3 on 6/2/2023                 Objective    BSA: 1.95 meters squared  /61 (BP Location: Left arm, Patient Position: Sitting, BP Cuff Size: Large adult)   Pulse 95   Temp 35.4 °C (95.7 °F) (Skin)   Resp 16   Ht (S) 1.612 m (5' 3.47\")   Wt 85 kg (187 lb 4.8 oz)   SpO2 98%   BMI 32.69 kg/m²      Physical Exam  Constitutional:       General: She is not in acute distress.     Appearance: She is not toxic-appearing.   HENT:      Head: Normocephalic.      Nose: Nose normal.      Mouth/Throat:      Mouth: Mucous membranes are moist.   Eyes:      Extraocular Movements: Extraocular movements intact.      Pupils: Pupils are equal, round, and reactive to light.   Cardiovascular:      Rate and Rhythm: Normal rate and regular rhythm.      Heart sounds: No murmur heard.  Pulmonary:      Effort: Pulmonary effort is normal.      Breath sounds: Normal breath sounds.   Abdominal:      General: Bowel sounds are normal.      Palpations: Abdomen is soft. There is no mass.      Tenderness: There is no abdominal tenderness. There is no rebound.   Musculoskeletal:         General: No swelling, tenderness, deformity or signs of injury.      Right lower leg: No edema.      Left lower leg: No edema.   Skin:     Coloration: Skin is not jaundiced.      Findings: No bruising, lesion or rash.   Neurological:      Mental Status: She is alert and oriented to person, place, and time.      Cranial Nerves: No cranial nerve deficit.      Motor: No weakness.      Gait: Gait normal.   Psychiatric:         Mood and Affect: Mood normal.         Performance Status:  Symptomatic; fully ambulatory      Assessment/Plan   #Lymphoma, extranodal marginal zone lymphoma involving " the orbital space extensively   -Thoroughly reviewed all available data at disease presentation in Feb 2023. Reviewed independently with Neuro-radiology. Tumor involvement  around the orbital space is quite extensive. The dura is possible also related. There is therefore a risk of CNS involvement / relapse. The stage is incomplete, and will require more body CT, spine MRI, bone marrow biopsy, and CSF analysis. However, these  results may be compromised by the treatment using steroid since end of Dec 2022.   The immediate plan is to prevent loss of vision in the left eye. In light of mild relapse of symptoms in the left eye, will increase prednisone back to 60 mg, while awaiting [facility] input on XRT of both orbits.   Other staging work up include: body CT, spine MRI, bone marrow biopsy, and CSF analysis. The results  do not show evidence of CSF or leptomeningeal involvement. MRI of the spine suggest possible bone marrow involvement. Bone marrow biopsy did not show evidence of lymphoma     -Systemic therapy is indicated due to significant involvement of the both orbits and surrounding tissues. Details described below.   The response to XRT and R-MTX has been encouraging. Results of Persistent but decreased soft tissue thickening and enhancement in the left orbit and surrounding tissues is likely indicative  of treatment response, and reaction and repair. However, persistent lymphoma cannot be ruled out. Unfortunately, neither MRI and PET/CT will be very sensitive to detect it. Will need to monitor closely, will likely have MRI orbits q3mon for the first 12 months.      -Results of MRI orbits shows: Findings consistent with left orbital lymphoma are slightly decreased from the prior study dated 06/26/2023. Question focal increased signal within the intracanalicular segment of the left optic nerve versus volume averaging. Attention on follow-up exams.  -Review latest MRI results in 3/2024:   -The above is more consistent  with post-treatment effects. Will continue to monitor closely. MRI brain in 3 mon, then MRI orbits in another 3 mon since June 2023 to March 2024. So far no evidence of relapse. Will transition to MRI q6mon, next MRI brain due 9/2024.      #Treatment   Will benefit from high dose methotrexate and rituximab. The goal is to prevent CNS relapse and local (orbital) relapse.   Discussed the AEs of both drugs, which are numerous. In particular, discussed SAEs such as PML due to IRAIS viral encephalitis, mutositis, TAI, or even rarely death.   Patient understood the benefits and risks of the treatment and consented.   She is non-reactive to HBV core Ab. Therefore the risk of HBV reactivation is low.    She subsequently was admitted on 4/21 and 5/12 to receive high dose MTX + ritux, and tolerated both cycles well. C3 on 6/2 is the last planned chemo. Treatment is completed.      Plan:   RTC with JOE/MD q3mon.   Will transition to MRI q6mon, next MRI brain due 9/2024.    Time spent > 35 min, with more than 50% on counseling and coordinating care.    Cancer Staging   No matching staging information was found for the patient.    Oncology History    No history exists.                 Tonya Tolbert MD PhD

## 2024-04-10 ENCOUNTER — APPOINTMENT (OUTPATIENT)
Dept: RADIATION ONCOLOGY | Facility: HOSPITAL | Age: 74
End: 2024-04-10
Payer: MEDICARE

## 2024-04-15 ENCOUNTER — LAB (OUTPATIENT)
Dept: LAB | Facility: LAB | Age: 74
End: 2024-04-15
Payer: MEDICARE

## 2024-04-15 ENCOUNTER — HOSPITAL ENCOUNTER (OUTPATIENT)
Dept: RADIOLOGY | Facility: HOSPITAL | Age: 74
Discharge: HOME | End: 2024-04-15
Payer: MEDICARE

## 2024-04-15 VITALS — WEIGHT: 190 LBS | HEIGHT: 63 IN | BODY MASS INDEX: 33.66 KG/M2

## 2024-04-15 DIAGNOSIS — Z00.00 ANNUAL PHYSICAL EXAM: ICD-10-CM

## 2024-04-15 DIAGNOSIS — C85.89 MARGINAL ZONE LYMPHOMA OF EXTRANODAL AND SOLID ORGAN SITES (MULTI): ICD-10-CM

## 2024-04-15 DIAGNOSIS — E03.9 HYPOTHYROIDISM, UNSPECIFIED TYPE: ICD-10-CM

## 2024-04-15 DIAGNOSIS — Z12.31 BREAST CANCER SCREENING BY MAMMOGRAM: ICD-10-CM

## 2024-04-15 LAB
ALBUMIN SERPL BCP-MCNC: 4.3 G/DL (ref 3.4–5)
ALP SERPL-CCNC: 125 U/L (ref 33–136)
ALT SERPL W P-5'-P-CCNC: 9 U/L (ref 7–45)
ANION GAP SERPL CALC-SCNC: 11 MMOL/L (ref 10–20)
AST SERPL W P-5'-P-CCNC: 13 U/L (ref 9–39)
BASOPHILS # BLD AUTO: 0.07 X10*3/UL (ref 0–0.1)
BASOPHILS NFR BLD AUTO: 1.1 %
BILIRUB SERPL-MCNC: 0.8 MG/DL (ref 0–1.2)
BUN SERPL-MCNC: 21 MG/DL (ref 6–23)
CALCIUM SERPL-MCNC: 9.1 MG/DL (ref 8.6–10.3)
CHLORIDE SERPL-SCNC: 104 MMOL/L (ref 98–107)
CHOLEST SERPL-MCNC: 171 MG/DL (ref 0–199)
CHOLESTEROL/HDL RATIO: 2.6
CO2 SERPL-SCNC: 28 MMOL/L (ref 21–32)
CREAT SERPL-MCNC: 0.93 MG/DL (ref 0.5–1.05)
EGFRCR SERPLBLD CKD-EPI 2021: 65 ML/MIN/1.73M*2
EOSINOPHIL # BLD AUTO: 0.35 X10*3/UL (ref 0–0.4)
EOSINOPHIL NFR BLD AUTO: 5.3 %
ERYTHROCYTE [DISTWIDTH] IN BLOOD BY AUTOMATED COUNT: 13.7 % (ref 11.5–14.5)
GLUCOSE SERPL-MCNC: 87 MG/DL (ref 74–99)
HCT VFR BLD AUTO: 44.5 % (ref 36–46)
HDLC SERPL-MCNC: 66.9 MG/DL
HGB BLD-MCNC: 14.2 G/DL (ref 12–16)
IMM GRANULOCYTES # BLD AUTO: 0.02 X10*3/UL (ref 0–0.5)
IMM GRANULOCYTES NFR BLD AUTO: 0.3 % (ref 0–0.9)
LDH SERPL L TO P-CCNC: 158 U/L (ref 84–246)
LDLC SERPL CALC-MCNC: 81 MG/DL
LYMPHOCYTES # BLD AUTO: 1.4 X10*3/UL (ref 0.8–3)
LYMPHOCYTES NFR BLD AUTO: 21.1 %
MCH RBC QN AUTO: 27.6 PG (ref 26–34)
MCHC RBC AUTO-ENTMCNC: 31.9 G/DL (ref 32–36)
MCV RBC AUTO: 86 FL (ref 80–100)
MONOCYTES # BLD AUTO: 0.6 X10*3/UL (ref 0.05–0.8)
MONOCYTES NFR BLD AUTO: 9 %
NEUTROPHILS # BLD AUTO: 4.19 X10*3/UL (ref 1.6–5.5)
NEUTROPHILS NFR BLD AUTO: 63.2 %
NON HDL CHOLESTEROL: 104 MG/DL (ref 0–149)
NRBC BLD-RTO: 0 /100 WBCS (ref 0–0)
PLATELET # BLD AUTO: 241 X10*3/UL (ref 150–450)
POTASSIUM SERPL-SCNC: 4.4 MMOL/L (ref 3.5–5.3)
PROT SERPL-MCNC: 6.8 G/DL (ref 6.4–8.2)
RBC # BLD AUTO: 5.15 X10*6/UL (ref 4–5.2)
SODIUM SERPL-SCNC: 139 MMOL/L (ref 136–145)
TRIGL SERPL-MCNC: 115 MG/DL (ref 0–149)
TSH SERPL-ACNC: 1.71 MIU/L (ref 0.44–3.98)
VLDL: 23 MG/DL (ref 0–40)
WBC # BLD AUTO: 6.6 X10*3/UL (ref 4.4–11.3)

## 2024-04-15 PROCEDURE — 77063 BREAST TOMOSYNTHESIS BI: CPT

## 2024-04-15 PROCEDURE — 80053 COMPREHEN METABOLIC PANEL: CPT

## 2024-04-15 PROCEDURE — 84443 ASSAY THYROID STIM HORMONE: CPT

## 2024-04-15 PROCEDURE — 77063 BREAST TOMOSYNTHESIS BI: CPT | Performed by: STUDENT IN AN ORGANIZED HEALTH CARE EDUCATION/TRAINING PROGRAM

## 2024-04-15 PROCEDURE — 36415 COLL VENOUS BLD VENIPUNCTURE: CPT

## 2024-04-15 PROCEDURE — 80061 LIPID PANEL: CPT

## 2024-04-15 PROCEDURE — 77067 SCR MAMMO BI INCL CAD: CPT | Performed by: STUDENT IN AN ORGANIZED HEALTH CARE EDUCATION/TRAINING PROGRAM

## 2024-04-15 PROCEDURE — 83615 LACTATE (LD) (LDH) ENZYME: CPT

## 2024-04-15 PROCEDURE — 85025 COMPLETE CBC W/AUTO DIFF WBC: CPT

## 2024-04-16 ENCOUNTER — APPOINTMENT (OUTPATIENT)
Dept: RADIATION ONCOLOGY | Facility: HOSPITAL | Age: 74
End: 2024-04-16
Payer: MEDICARE

## 2024-04-26 ENCOUNTER — HOSPITAL ENCOUNTER (OUTPATIENT)
Dept: RADIOLOGY | Facility: HOSPITAL | Age: 74
Discharge: HOME | End: 2024-04-26
Payer: MEDICARE

## 2024-04-26 DIAGNOSIS — Z87.891 PERSONAL HISTORY OF NICOTINE DEPENDENCE: ICD-10-CM

## 2024-04-26 PROCEDURE — 71271 CT THORAX LUNG CANCER SCR C-: CPT

## 2024-05-02 ENCOUNTER — TELEPHONE (OUTPATIENT)
Dept: PRIMARY CARE | Facility: CLINIC | Age: 74
End: 2024-05-02
Payer: MEDICARE

## 2024-05-02 DIAGNOSIS — E78.5 HYPERLIPIDEMIA, UNSPECIFIED HYPERLIPIDEMIA TYPE: Primary | ICD-10-CM

## 2024-05-02 DIAGNOSIS — Z87.891 PERSONAL HISTORY OF NICOTINE DEPENDENCE: ICD-10-CM

## 2024-05-02 RX ORDER — ATORVASTATIN CALCIUM 40 MG/1
40 TABLET, FILM COATED ORAL DAILY
Qty: 100 TABLET | Refills: 3 | Status: SHIPPED | OUTPATIENT
Start: 2024-05-02 | End: 2025-06-06

## 2024-05-02 NOTE — TELEPHONE ENCOUNTER
Daily Note     Today's date: 2023  Patient name: Liane Anthony  : 1993  MRN: [id number]  Referring provider: Pardeep Casper  Dx:   Encounter Diagnosis     ICD-10-CM    1. Left wrist pain  M25.532       2. Contusion of left wrist, initial encounter  S60.212A                      Subjective: When I twist my wrist is when it hurts the most.      Objective: See treatment diary below      Assessment: Tolerated treatment well. Patient continues with pain with rotational movements of the wrist.  KT tape deferred due to skin irritation. Plan: Continue per plan of care.       Precautions: universal       Manuals   IE            IASTM DW 4m 4m           MOB wrist  4m 4m           MSM midcarpal e/f 2m 2m                         HEP 4x day            KT tape wr ext  apply                                                                                          Ther Ex             A/PROM L wrist  2m 2m           Wrist ROM W/cone 2m 3x10           A/PROM P/S 2m Dowel 3x10           Gripping- nuetral  RPW 2x30                                                               Ther Activity             Pegs  neutral wrist  30x                         Gait Training                                       Modalities             MH 6m 8m           CP  5m Result Communication    Resulted Orders   CT lung screening low dose    Narrative    Interpreted By:  Mika Mendez,   STUDY:  CT LUNG SCREENING LOW DOSE; 4/26/2024 9:45 am      INDICATION:  Signs/Symptoms:screening.      COMPARISON:  CT dated 02/03/2023      ACCESSION NUMBER(S):  [id number]      ORDERING CLINICIAN:  GENESIS BEAR      TECHNIQUE:  Helical data acquisition of the chest was obtained without IV  contrast material.  Images were reformatted in axial, coronal, and  sagittal planes.      FINDINGS:  LUNGS AND AIRWAYS:  The trachea and central airways are patent. No endobronchial lesion  is seen.Diffuse peribronchial thickening which is nonspecific.      There is minimal bilateral upper lung predominant centrilobular and  paraseptal emphysema.Stable thin-walled cyst in the right lower  lobe.There is no focal consolidation, pleural effusion, or  pneumothorax.      No suspicious nodules.      MEDIASTINUM AND MARSHA, LOWER NECK AND AXILLA:  Thyroid gland not well seen.  No evidence of thoracic lymphadenopathy by CT criteria.  There is a smallsized hiatal hernia. Otherwise, the esophagus is  within normal limits.      HEART AND VESSELS:  The thoracic aorta normal in course and caliber.There is mild  scattered calcified atherosclerosis present. Main pulmonary artery  and its branches are normal in caliber. Estimated coronary artery  calcium score is 10. The cardiac chambers are not enlarged.  There is no pericardial effusion seen.      UPPER ABDOMEN:  Calcified gallstone is present.              CHEST WALL AND OSSEOUS STRUCTURES:  Chest wall is within normal limits.  No acute osseous pathology.There are no suspicious osseous lesions.        Impression    1. No pulmonary nodules identified. Continued screening with low-dose  noncontrast chest CT in 12 months (from current date) is recommended.  2. Estimated coronary artery calcium score is 10*. This patient is at  mildly increased risk for future cardiovascular  events such as  myocardial infarction, ischemic stroke, and cardiovascular death.  Consider addressing modifiable cardiovascular risk factors and  initiating preventive therapies such as a statin or low-dose aspirin  per the most recent guidelines for primary prevention (e.g.  https://doi.org/10.1161/CIR.8727794317318216). If further assistance  is required, consider referral to the [facility] Cardiovascular  Prevention Program ( Preventive Cardiology): Place EPIC referral to  Cardiology Prevention Program.  3. Cholelithiasis.          LUNG RADS CATEGORY:  Lung Rad: Lung-RADS 1 (Negative)      Recommendation: Continue annual screening with Low Dose Chest CT in  12 months, recommended as per American College of Radiology  Guidelines Lung-RADS Version 2022.                  MACRO:  None      Signed by: Mika Mendez 5/1/2024 5:56 PM  Dictation workstation:   [id number]       10:51 AM      Results were successfully communicated with the patient and they acknowledged their understanding.

## 2024-05-06 ENCOUNTER — TELEPHONE (OUTPATIENT)
Dept: RADIATION ONCOLOGY | Facility: HOSPITAL | Age: 74
End: 2024-05-06
Payer: MEDICARE

## 2024-05-07 ENCOUNTER — HOSPITAL ENCOUNTER (OUTPATIENT)
Dept: RADIATION ONCOLOGY | Facility: HOSPITAL | Age: 74
Setting detail: RADIATION/ONCOLOGY SERIES
Discharge: HOME | End: 2024-05-07
Payer: MEDICARE

## 2024-05-07 VITALS
DIASTOLIC BLOOD PRESSURE: 73 MMHG | SYSTOLIC BLOOD PRESSURE: 143 MMHG | BODY MASS INDEX: 33.82 KG/M2 | WEIGHT: 190.9 LBS | TEMPERATURE: 97.9 F | OXYGEN SATURATION: 98 % | HEART RATE: 73 BPM | RESPIRATION RATE: 18 BRPM

## 2024-05-07 DIAGNOSIS — C85.99 ORBITAL LYMPHOMA (MULTI): Primary | ICD-10-CM

## 2024-05-07 PROCEDURE — 99213 OFFICE O/P EST LOW 20 MIN: CPT | Performed by: STUDENT IN AN ORGANIZED HEALTH CARE EDUCATION/TRAINING PROGRAM

## 2024-05-07 ASSESSMENT — ENCOUNTER SYMPTOMS
RESPIRATORY NEGATIVE: 1
CARDIOVASCULAR NEGATIVE: 1
CONSTITUTIONAL NEGATIVE: 1
PSYCHIATRIC NEGATIVE: 1
EYE PROBLEMS: 1
ENDOCRINE NEGATIVE: 1
GASTROINTESTINAL NEGATIVE: 1
HEMATOLOGIC/LYMPHATIC NEGATIVE: 1

## 2024-05-07 NOTE — PROGRESS NOTES
Staff Physician: Marbin Webster MD, MS  Resident Physician: Malik Garcia MD PGY-3  Referring Physician: Marbin Wbester MD, *  Date of Service: 2024  RADIATION ONCOLOGY FOLLOW UP NOTE  IDENTIFYING DATA:  Cancer Staging   No matching staging information was found for the patient.    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 She presents for routine follow-up.     INTERVAL HISTORY  Since we last saw Ирина Wesley in clinic on 1/10/24, she has felt well. She denies any concerns for new visual or hearing changes, recent headaches with associated nausea/vomiting, or new numbness/tingling, or weakness. She is currently able to complete their own ADLs without significant difficulty.     Her last imaging, comprising MRI Orbit on 3/11/24, demonstrates stable disease with no evidence of interval increase in size or enhancement. Has ongoing left optic nerve signal abnormality that likely represents post-radiation changes.     PAST MEDICAL, SURGICAL, FAMILY, AND SOCIAL HISTORY:  Past Medical History:   Diagnosis Date    Essential (primary) hypertension 2022    Hypertension    Essential (primary) hypertension 2022    Hypertension    Essential (primary) hypertension 2022    Hypertension    Essential (primary) hypertension 2022    Hypertension    Hypothyroidism, unspecified 2022    Hypothyroid    Nontoxic multinodular goiter 2020    Multinodular goiter    Sensorineural hearing loss, bilateral 02/10/2022    Sensorineural hearing loss (SNHL) of both ears     Past Surgical History:   Procedure Laterality Date     SECTION, CLASSIC      HYSTERECTOMY      OTHER SURGICAL HISTORY  2021    Cataract surgery     ALLERGIES:  Allergies   Allergen Reactions    Codeine Unknown      MEDICATIONS:    Current Outpatient Medications:     atorvastatin (Lipitor) 40 mg tablet, Take 1 tablet (40 mg) by mouth once daily., Disp: 100 tablet, Rfl: 3    fexofenadine (Allegra) 180 mg tablet, Take 1 tablet (180 mg) by mouth once daily., Disp: 30 tablet, Rfl: 5    levothyroxine (Synthroid, Levoxyl) 150 mcg tablet, Take 1 tablet (150 mcg) by mouth once daily., Disp: 90 tablet, Rfl: 3    lisinopril 10 mg tablet, Take 1 tablet (10 mg) by mouth once daily., Disp: 90 tablet, Rfl: 3     REVIEW OF SYSTEMS:  Except for the symptoms described in the interval history, the review of systems is negative. Specifically, except as noted, when asked the patient expressed no complaints relative to constitutional (fever, weight loss), eyes, ears, nose, mouth, throat, neurologic, cardiovascular, pulmonary, breast, GI, , skin, musculoskeletal, endocrine, hematologic/lymphatic, or immunologic systems.    PERFORMANCE STATUS:  Karnofsky Performance Score/ECO, Normal activity with effort; some signs or symptoms of disease (ECOG equivalent 1)    PHYSICAL EXAMINATION:  LMP  (LMP Unknown)   Pain score: 0/10    CN II: endorses some persistent blurry vision of left eye  CN II, IV, V, VI, VII intact  CN VIII: endorses mild hearing loss, cannot  hear finger rub  CN IX, XI, XII intact    DIAGNOSTIC REPORTS REVIEWED:  Imaging: All imaging was personally reviewed and interpreted in clinic. Findings as per interval history and EMR.  Laboratory/Pathology:  All pertinent labs and pathology were personally reviewed and interpreted in clinic. Findings as per interval history and EMR.     IMPRESSION:  73 year old woman with MALToma of the orbits s/p high-dose prednisone, RT to the orbits and CONSTANCE completed on 3/2023, and high-dose MTX and rituximab on 2023. She continues to have ongoing vision changes due to the ongoing nature of her disease course and treatment-related side effects, but has ongoing improvement in her overall blurriness  since last visit.     PLAN:  I have asked Ирина Wesley to please return to clinic in 6 month's time with repeat MRI orbits w/w/o contrast coordinated with medical oncology. The patient knows to call us in the interim for any further questions/concerns.     PAIN PLAN: The patient reports their pain is well-controlled on their current regimen.  DISEASE STATUS: Controlled  NEW METACHRONOUS CANCER: No    Thank you for the opportunity to participate in the ongoing care of this pleasant woman.    The patient was evaluated by and discussed with attending physician, Dr. Webster, who repeated all key aspects of the HPI and physical exam.    Malik Garcia MD   PGY-3 Radiation Oncology

## 2024-05-07 NOTE — PROGRESS NOTES
Radiation Oncology Nursing Note    Pain: The patient's current pain level was assessed.  They report currently having a pain of 0 out of 10.  They feel their pain is under control without the use of pain medications.    Review of Systems:  Review of Systems   Constitutional: Negative.    HENT:   Positive for hearing loss.    Eyes:  Positive for eye problems.   Respiratory: Negative.     Cardiovascular: Negative.    Gastrointestinal: Negative.    Endocrine: Negative.    Genitourinary: Negative.     Musculoskeletal:  Positive for gait problem.   Skin: Negative.    Neurological:  Positive for gait problem.   Hematological: Negative.    Psychiatric/Behavioral: Negative.

## 2024-06-14 ENCOUNTER — INFUSION (OUTPATIENT)
Dept: HEMATOLOGY/ONCOLOGY | Facility: HOSPITAL | Age: 74
End: 2024-06-14
Payer: MEDICARE

## 2024-06-14 ENCOUNTER — OFFICE VISIT (OUTPATIENT)
Dept: HEMATOLOGY/ONCOLOGY | Facility: HOSPITAL | Age: 74
End: 2024-06-14
Payer: MEDICARE

## 2024-06-14 VITALS
OXYGEN SATURATION: 98 % | WEIGHT: 189.15 LBS | RESPIRATION RATE: 18 BRPM | BODY MASS INDEX: 33.51 KG/M2 | DIASTOLIC BLOOD PRESSURE: 75 MMHG | SYSTOLIC BLOOD PRESSURE: 140 MMHG | HEART RATE: 90 BPM | TEMPERATURE: 98.2 F

## 2024-06-14 DIAGNOSIS — C85.89 MARGINAL ZONE LYMPHOMA OF EXTRANODAL AND SOLID ORGAN SITES (MULTI): ICD-10-CM

## 2024-06-14 DIAGNOSIS — C85.89 MARGINAL ZONE LYMPHOMA OF EXTRANODAL AND SOLID ORGAN SITES (MULTI): Primary | ICD-10-CM

## 2024-06-14 LAB
ALBUMIN SERPL BCP-MCNC: 4.3 G/DL (ref 3.4–5)
ALP SERPL-CCNC: 120 U/L (ref 33–136)
ALT SERPL W P-5'-P-CCNC: 10 U/L (ref 7–45)
ANION GAP SERPL CALC-SCNC: 14 MMOL/L (ref 10–20)
AST SERPL W P-5'-P-CCNC: 12 U/L (ref 9–39)
BASOPHILS # BLD AUTO: 0.05 X10*3/UL (ref 0–0.1)
BASOPHILS NFR BLD AUTO: 0.7 %
BILIRUB SERPL-MCNC: 0.7 MG/DL (ref 0–1.2)
BUN SERPL-MCNC: 24 MG/DL (ref 6–23)
CALCIUM SERPL-MCNC: 9.3 MG/DL (ref 8.6–10.3)
CHLORIDE SERPL-SCNC: 106 MMOL/L (ref 98–107)
CO2 SERPL-SCNC: 27 MMOL/L (ref 21–32)
CREAT SERPL-MCNC: 0.86 MG/DL (ref 0.5–1.05)
EGFRCR SERPLBLD CKD-EPI 2021: 71 ML/MIN/1.73M*2
EOSINOPHIL # BLD AUTO: 0.31 X10*3/UL (ref 0–0.4)
EOSINOPHIL NFR BLD AUTO: 4.2 %
ERYTHROCYTE [DISTWIDTH] IN BLOOD BY AUTOMATED COUNT: 13.6 % (ref 11.5–14.5)
GLUCOSE SERPL-MCNC: 91 MG/DL (ref 74–99)
HCT VFR BLD AUTO: 44.7 % (ref 36–46)
HGB BLD-MCNC: 14.6 G/DL (ref 12–16)
IMM GRANULOCYTES # BLD AUTO: 0.02 X10*3/UL (ref 0–0.5)
IMM GRANULOCYTES NFR BLD AUTO: 0.3 % (ref 0–0.9)
LDH SERPL L TO P-CCNC: 140 U/L (ref 84–246)
LYMPHOCYTES # BLD AUTO: 1.34 X10*3/UL (ref 0.8–3)
LYMPHOCYTES NFR BLD AUTO: 18 %
MCH RBC QN AUTO: 27.8 PG (ref 26–34)
MCHC RBC AUTO-ENTMCNC: 32.7 G/DL (ref 32–36)
MCV RBC AUTO: 85 FL (ref 80–100)
MONOCYTES # BLD AUTO: 0.65 X10*3/UL (ref 0.05–0.8)
MONOCYTES NFR BLD AUTO: 8.7 %
NEUTROPHILS # BLD AUTO: 5.09 X10*3/UL (ref 1.6–5.5)
NEUTROPHILS NFR BLD AUTO: 68.1 %
NRBC BLD-RTO: 0 /100 WBCS (ref 0–0)
PLATELET # BLD AUTO: 237 X10*3/UL (ref 150–450)
POTASSIUM SERPL-SCNC: 4.2 MMOL/L (ref 3.5–5.3)
PROT SERPL-MCNC: 7.3 G/DL (ref 6.4–8.2)
RBC # BLD AUTO: 5.25 X10*6/UL (ref 4–5.2)
SODIUM SERPL-SCNC: 143 MMOL/L (ref 136–145)
WBC # BLD AUTO: 7.5 X10*3/UL (ref 4.4–11.3)

## 2024-06-14 PROCEDURE — 83615 LACTATE (LD) (LDH) ENZYME: CPT

## 2024-06-14 PROCEDURE — 99215 OFFICE O/P EST HI 40 MIN: CPT | Performed by: NURSE PRACTITIONER

## 2024-06-14 PROCEDURE — 80053 COMPREHEN METABOLIC PANEL: CPT

## 2024-06-14 PROCEDURE — 85025 COMPLETE CBC W/AUTO DIFF WBC: CPT

## 2024-06-14 PROCEDURE — 36415 COLL VENOUS BLD VENIPUNCTURE: CPT

## 2024-06-14 PROCEDURE — 1160F RVW MEDS BY RX/DR IN RCRD: CPT | Performed by: NURSE PRACTITIONER

## 2024-06-14 PROCEDURE — 1159F MED LIST DOCD IN RCRD: CPT | Performed by: NURSE PRACTITIONER

## 2024-06-14 ASSESSMENT — PAIN SCALES - GENERAL: PAINLEVEL: 1

## 2024-06-14 NOTE — PROGRESS NOTES
Patient presents to infusion appointment in stable condition. Reports feeling increased pressure on left eye recently, intermittent headaches. MONSTER Kenyon CNP notified and at bedside to assess patient. Labs drawn peripherally without incident. Results reviewed with patient. Per provider patient to be scheduled for STAT MRI, patient and daughter aware and brought to . Discharged in stable condition at 1220.

## 2024-06-17 ENCOUNTER — HOSPITAL ENCOUNTER (OUTPATIENT)
Dept: RADIOLOGY | Facility: HOSPITAL | Age: 74
Discharge: HOME | End: 2024-06-17
Payer: MEDICARE

## 2024-06-17 DIAGNOSIS — C85.89 MARGINAL ZONE LYMPHOMA OF EXTRANODAL AND SOLID ORGAN SITES (MULTI): ICD-10-CM

## 2024-06-17 PROCEDURE — 70553 MRI BRAIN STEM W/O & W/DYE: CPT | Performed by: RADIOLOGY

## 2024-06-17 PROCEDURE — 70553 MRI BRAIN STEM W/O & W/DYE: CPT

## 2024-06-17 PROCEDURE — A9575 INJ GADOTERATE MEGLUMI 0.1ML: HCPCS | Performed by: NURSE PRACTITIONER

## 2024-06-17 PROCEDURE — 2550000001 HC RX 255 CONTRASTS: Performed by: NURSE PRACTITIONER

## 2024-06-17 RX ORDER — GADOTERATE MEGLUMINE 376.9 MG/ML
17 INJECTION INTRAVENOUS
Status: COMPLETED | OUTPATIENT
Start: 2024-06-17 | End: 2024-06-17

## 2024-06-19 ASSESSMENT — ENCOUNTER SYMPTOMS: EYE PROBLEMS: 1

## 2024-06-19 NOTE — PROGRESS NOTES
Patient ID: рИина Wesley is a 73 y.o. female.    Subjective    HPI  Presents today for follow up visit, accompanied by her daughter. She reports recent left eye pressure over the past couple of weeks. Her vision at baseline is not perfect in that eye, but it seems more cloudy recently. No other focal neurological deficits.     Review of Systems   Eyes:  Positive for eye problems.       Objective    BSA: There is no height or weight on file to calculate BSA.  LMP  (LMP Unknown)   Vital signs reviewed today.      Physical Exam  Constitutional:       Appearance: Normal appearance.   HENT:      Head: Normocephalic.      Nose: Nose normal.      Mouth/Throat:      Mouth: Mucous membranes are moist.      Pharynx: Oropharynx is clear.   Eyes:      Extraocular Movements: Extraocular movements intact.      Conjunctiva/sclera: Conjunctivae normal.      Pupils: Pupils are equal, round, and reactive to light.   Cardiovascular:      Rate and Rhythm: Normal rate and regular rhythm.      Pulses: Normal pulses.      Heart sounds: Normal heart sounds.   Pulmonary:      Effort: Pulmonary effort is normal.      Breath sounds: Normal breath sounds.   Abdominal:      General: Abdomen is flat. Bowel sounds are normal.      Palpations: Abdomen is soft.   Musculoskeletal:         General: Normal range of motion.      Cervical back: Normal range of motion.   Skin:     General: Skin is warm and dry.      Capillary Refill: Capillary refill takes less than 2 seconds.   Neurological:      General: No focal deficit present.      Mental Status: She is alert and oriented to person, place, and time. Mental status is at baseline.   Psychiatric:         Mood and Affect: Mood normal.         Performance Status:  Karnofsky Score: 80 - Normal activity with effort; some signs or symptoms of disease      Assessment/Plan        Oncology History    No history exists.      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 Reviewed independently with Neuro-radiology. Tumor involvement  around the orbital space is quite extensive. The dura is possible also related. There is therefore a risk of CNS involvement / relapse. The stage is incomplete, and will require more body CT, spine MRI, bone marrow biopsy, and CSF analysis. However, these  results may be compromised by the treatment using steroid since end of Dec 2022.   The immediate plan is to prevent loss of vision in the left eye. In light of mild relapse of symptoms in the left eye, will increase prednisone back to 60 mg, while awaiting [facility] input on XRT of both orbits.   Other staging work up include: body CT, spine MRI, bone marrow biopsy, and CSF analysis. The results  do not show evidence of CSF or leptomeningeal involvement. MRI of the spine suggest possible bone marrow involvement. Bone marrow biopsy did not show evidence of lymphoma     -Systemic therapy is indicated due to significant involvement of the both orbits and surrounding tissues. Details described below.   The response to XRT and R-MTX has been encouraging. Results of Persistent but decreased soft tissue thickening and enhancement in the left orbit and surrounding tissues is likely indicative  of treatment response, and reaction and repair. However, persistent lymphoma cannot be ruled out. Unfortunately, neither MRI and PET/CT will be very sensitive to detect it. Will need to monitor closely, will likely have MRI orbits q3mon for the first 12 months.      -Results of MRI orbits shows: Findings consistent with left orbital lymphoma are slightly decreased from the prior study dated 06/26/2023. Question focal increased signal within the intracanalicular segment of the left optic nerve versus volume averaging. Attention on follow-up exams.  -Review latest MRI results in 3/2024:   -The above is more consistent with post-treatment effects.  Will continue to monitor closely. MRI brain in 3 mon, then MRI orbits in another 3 mon since June 2023 to March 2024. So far no evidence of relapse. Will transition to MRI q6mon, next MRI brain due 9/2024.   - Urgent MRI Brain obtained given new left eye pressure, although not concerning for disease recurrence.  - Will repeat MRI Orbit in 1 month with follow up & refer to Optho for further evaluation.     #Treatment   Will benefit from high dose methotrexate and rituximab. The goal is to prevent CNS relapse and local (orbital) relapse. Completed treatment 6/2/2023.    Plan:   Repeat MRI Orbit in 1 month with Follow up with Dr. Tolbert  Referral to Optho for evaluation of left eye pressure  If MRI Orbit normal & symptoms improve, return to routine of alternating JOE/MD q3mon.         ERIN Pickens-CNP  [facility]

## 2024-07-05 ENCOUNTER — APPOINTMENT (OUTPATIENT)
Dept: RADIOLOGY | Facility: HOSPITAL | Age: 74
End: 2024-07-05
Payer: MEDICARE

## 2024-08-02 ENCOUNTER — HOSPITAL ENCOUNTER (OUTPATIENT)
Dept: RADIOLOGY | Facility: HOSPITAL | Age: 74
Discharge: HOME | End: 2024-08-02
Payer: MEDICARE

## 2024-08-02 DIAGNOSIS — C85.89 MARGINAL ZONE LYMPHOMA OF EXTRANODAL AND SOLID ORGAN SITES (MULTI): ICD-10-CM

## 2024-08-02 PROCEDURE — 70543 MRI ORBT/FAC/NCK W/O &W/DYE: CPT

## 2024-08-02 PROCEDURE — A9575 INJ GADOTERATE MEGLUMI 0.1ML: HCPCS | Performed by: NURSE PRACTITIONER

## 2024-08-02 PROCEDURE — 2550000001 HC RX 255 CONTRASTS: Performed by: NURSE PRACTITIONER

## 2024-08-02 RX ORDER — GADOTERATE MEGLUMINE 376.9 MG/ML
17 INJECTION INTRAVENOUS
Status: COMPLETED | OUTPATIENT
Start: 2024-08-02 | End: 2024-08-02

## 2024-08-22 ENCOUNTER — OFFICE VISIT (OUTPATIENT)
Dept: HEMATOLOGY/ONCOLOGY | Facility: HOSPITAL | Age: 74
End: 2024-08-22
Payer: MEDICARE

## 2024-08-22 ENCOUNTER — LAB (OUTPATIENT)
Dept: LAB | Facility: HOSPITAL | Age: 74
End: 2024-08-22
Payer: MEDICARE

## 2024-08-22 VITALS
OXYGEN SATURATION: 94 % | HEART RATE: 82 BPM | DIASTOLIC BLOOD PRESSURE: 89 MMHG | BODY MASS INDEX: 33.06 KG/M2 | WEIGHT: 192.6 LBS | SYSTOLIC BLOOD PRESSURE: 145 MMHG | TEMPERATURE: 97.7 F

## 2024-08-22 DIAGNOSIS — C85.89 MARGINAL ZONE LYMPHOMA OF EXTRANODAL AND SOLID ORGAN SITES (MULTI): ICD-10-CM

## 2024-08-22 LAB
ALBUMIN SERPL BCP-MCNC: 4.2 G/DL (ref 3.4–5)
ALP SERPL-CCNC: 118 U/L (ref 33–136)
ALT SERPL W P-5'-P-CCNC: 10 U/L (ref 7–45)
ANION GAP SERPL CALC-SCNC: 14 MMOL/L (ref 10–20)
AST SERPL W P-5'-P-CCNC: 11 U/L (ref 9–39)
BASOPHILS # BLD AUTO: 0.05 X10*3/UL (ref 0–0.1)
BASOPHILS NFR BLD AUTO: 0.7 %
BILIRUB SERPL-MCNC: 0.5 MG/DL (ref 0–1.2)
BUN SERPL-MCNC: 25 MG/DL (ref 6–23)
CALCIUM SERPL-MCNC: 9.2 MG/DL (ref 8.6–10.3)
CHLORIDE SERPL-SCNC: 106 MMOL/L (ref 98–107)
CO2 SERPL-SCNC: 26 MMOL/L (ref 21–32)
CREAT SERPL-MCNC: 0.84 MG/DL (ref 0.5–1.05)
EGFRCR SERPLBLD CKD-EPI 2021: 73 ML/MIN/1.73M*2
EOSINOPHIL # BLD AUTO: 0.34 X10*3/UL (ref 0–0.4)
EOSINOPHIL NFR BLD AUTO: 5 %
ERYTHROCYTE [DISTWIDTH] IN BLOOD BY AUTOMATED COUNT: 13.2 % (ref 11.5–14.5)
GLUCOSE SERPL-MCNC: 87 MG/DL (ref 74–99)
HCT VFR BLD AUTO: 44.3 % (ref 36–46)
HGB BLD-MCNC: 14.4 G/DL (ref 12–16)
IMM GRANULOCYTES # BLD AUTO: 0.02 X10*3/UL (ref 0–0.5)
IMM GRANULOCYTES NFR BLD AUTO: 0.3 % (ref 0–0.9)
LDH SERPL L TO P-CCNC: 130 U/L (ref 84–246)
LYMPHOCYTES # BLD AUTO: 1.47 X10*3/UL (ref 0.8–3)
LYMPHOCYTES NFR BLD AUTO: 21.6 %
MCH RBC QN AUTO: 27.5 PG (ref 26–34)
MCHC RBC AUTO-ENTMCNC: 32.5 G/DL (ref 32–36)
MCV RBC AUTO: 85 FL (ref 80–100)
MONOCYTES # BLD AUTO: 0.68 X10*3/UL (ref 0.05–0.8)
MONOCYTES NFR BLD AUTO: 10 %
NEUTROPHILS # BLD AUTO: 4.25 X10*3/UL (ref 1.6–5.5)
NEUTROPHILS NFR BLD AUTO: 62.4 %
NRBC BLD-RTO: 0 /100 WBCS (ref 0–0)
PLATELET # BLD AUTO: 244 X10*3/UL (ref 150–450)
POTASSIUM SERPL-SCNC: 4.2 MMOL/L (ref 3.5–5.3)
PROT SERPL-MCNC: 6.9 G/DL (ref 6.4–8.2)
RBC # BLD AUTO: 5.23 X10*6/UL (ref 4–5.2)
SODIUM SERPL-SCNC: 142 MMOL/L (ref 136–145)
WBC # BLD AUTO: 6.8 X10*3/UL (ref 4.4–11.3)

## 2024-08-22 PROCEDURE — 99214 OFFICE O/P EST MOD 30 MIN: CPT | Performed by: INTERNAL MEDICINE

## 2024-08-22 PROCEDURE — 36415 COLL VENOUS BLD VENIPUNCTURE: CPT

## 2024-08-22 PROCEDURE — 3079F DIAST BP 80-89 MM HG: CPT | Performed by: INTERNAL MEDICINE

## 2024-08-22 PROCEDURE — 83615 LACTATE (LD) (LDH) ENZYME: CPT

## 2024-08-22 PROCEDURE — 84075 ASSAY ALKALINE PHOSPHATASE: CPT

## 2024-08-22 PROCEDURE — 85025 COMPLETE CBC W/AUTO DIFF WBC: CPT

## 2024-08-22 PROCEDURE — 3077F SYST BP >= 140 MM HG: CPT | Performed by: INTERNAL MEDICINE

## 2024-08-22 PROCEDURE — 1126F AMNT PAIN NOTED NONE PRSNT: CPT | Performed by: INTERNAL MEDICINE

## 2024-08-22 ASSESSMENT — PAIN SCALES - GENERAL: PAINLEVEL: 0-NO PAIN

## 2024-08-22 NOTE — PROGRESS NOTES
"Patient ID: Ирина Wesley is a 73 y.o. female.    Subjective    Patient had no prior history of visual disturbance. In Nov 2022 she work up with swelling in the left eye. By December  the symptoms worsened, to the degree her vision in the left eye decreased significantly, and she could not move the left eye. She presented to ED on DEC 22, 2022. After MRI demonstrated \"evidence of extensive abnormal signal and enhancement infiltrating  the postseptal intraconal and extraconal left orbit\" she underwent a biopsy with Dr. Jackson Silva on DEC 24, 2022. In addition, after the biopsy she started prednisone 60 mg daily, resulting in symptomatic relief by day 3. While awaiting the pathology  results, her prednisone dose was tapered to 40 mg, then more recently to 20 mg daily. At 20 mg dose of prednisone the patient noticed the left eye symptoms (drooping, bulging) recurred to a mild degree.      The patient was seen by me on Jan 27, 2023 for Lymphoma, extranodal marginal zone lymphoma, involving the left orbits. Since then she has had more tests, including  MRI, labs, LP. In addition, she was referred to [facility] with Dr. Marbin Webster. While XRT is still pending, the prednisone dose was  increased back to 60 mg, which again improved the double vision of the left eye and swelling. XRT was initiated on 3/2/2023 daily, and finished on 3/17/2023. She feels well with both eye. Prednisone was tapered since then to 10 mg daily, and the last  dose was on 3/31/2023. She subsequently was admitted on 4/21 to receive high dose MTX + ritux, and tolerated it well. subsequently, she received C2 on 5/12/2023 and C3 on 6/2/2023. She had MRI brain or orbits in June, September, December 2023 showing no active disease.     8/22/2024 Today the patient feels well. Previously gained wt while on chemo, now losing it and feels well. No symptoms in the eyes. She has no headache. However, she experienced dizziness when changing positions, and this " has also improved since completing chemo. No cough. NO fever. No nausea vomiting diarrhea. Urine output as usual.    Treatment Synopsis:   XRT both orbits 3/2/2023 - 3/17/2023.  Prednisone 20-60 mg 12/2022-3/2023, completed tapering on 3/31/2023.  R-MTX C1 on 4/21/2023, C2 on 5/12/2023, C3 on 6/2/2023                 Objective    BSA: There is no height or weight on file to calculate BSA.  LMP  (LMP Unknown)      Physical Exam  Constitutional:       General: She is not in acute distress.     Appearance: She is not toxic-appearing.   HENT:      Head: Normocephalic.      Nose: Nose normal.      Mouth/Throat:      Mouth: Mucous membranes are moist.   Eyes:      Extraocular Movements: Extraocular movements intact.      Pupils: Pupils are equal, round, and reactive to light.   Cardiovascular:      Rate and Rhythm: Normal rate and regular rhythm.      Heart sounds: No murmur heard.  Pulmonary:      Effort: Pulmonary effort is normal.      Breath sounds: Normal breath sounds.   Abdominal:      General: Bowel sounds are normal.      Palpations: Abdomen is soft. There is no mass.      Tenderness: There is no abdominal tenderness. There is no rebound.   Musculoskeletal:         General: No swelling, tenderness, deformity or signs of injury.      Right lower leg: No edema.      Left lower leg: No edema.   Skin:     Coloration: Skin is not jaundiced.      Findings: No bruising, lesion or rash.   Neurological:      Mental Status: She is alert and oriented to person, place, and time.      Cranial Nerves: No cranial nerve deficit.      Motor: No weakness.      Gait: Gait normal.   Psychiatric:         Mood and Affect: Mood normal.         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 Reviewed independently with Neuro-radiology. Tumor  involvement  around the orbital space is quite extensive. The dura is possible also related. There is therefore a risk of CNS involvement / relapse. The stage is incomplete, and will require more body CT, spine MRI, bone marrow biopsy, and CSF analysis. However, these  results may be compromised by the treatment using steroid since end of Dec 2022.   The immediate plan is to prevent loss of vision in the left eye. In light of mild relapse of symptoms in the left eye, will increase prednisone back to 60 mg, while awaiting [facility] input on XRT of both orbits.   Other staging work up include: body CT, spine MRI, bone marrow biopsy, and CSF analysis. The results  do not show evidence of CSF or leptomeningeal involvement. MRI of the spine suggest possible bone marrow involvement. Bone marrow biopsy did not show evidence of lymphoma     -Systemic therapy is indicated due to significant involvement of the both orbits and surrounding tissues. Details described below.   The response to XRT and R-MTX has been encouraging. Results of Persistent but decreased soft tissue thickening and enhancement in the left orbit and surrounding tissues is likely indicative  of treatment response, and reaction and repair. However, persistent lymphoma cannot be ruled out. Unfortunately, neither MRI and PET/CT will be very sensitive to detect it. Will need to monitor closely, will likely have MRI orbits q3mon for the first 12 months.      -Results of MRI orbits shows: Findings consistent with left orbital lymphoma are slightly decreased from the prior study dated 06/26/2023. Question focal increased signal within the intracanalicular segment of the left optic nerve versus volume averaging. Attention on follow-up exams.  -Review latest MRI results in 3/2024:   -The above is more consistent with post-treatment effects. Will continue to monitor closely. MRI brain in 3 mon, then MRI orbits in another 3 mon since June 2023 to March 2024. So far no  evidence of relapse. Will transition to MRI q6mon, next MRI brain due 9/2024.   -8/22/2024 reviewed the MRI orbit results. The abnormal signals remain stable, and likely represent post-treatment effects.      #Treatment   Will benefit from high dose methotrexate and rituximab. The goal is to prevent CNS relapse and local (orbital) relapse.   Discussed the AEs of both drugs, which are numerous. In particular, discussed SAEs such as PML due to IRAIS viral encephalitis, mutositis, TAI, or even rarely death.   Patient understood the benefits and risks of the treatment and consented.   She is non-reactive to HBV core Ab. Therefore the risk of HBV reactivation is low.    She subsequently was admitted on 4/21 and 5/12 to receive high dose MTX + ritux, and tolerated both cycles well. C3 on 6/2 is the last planned chemo. Treatment is completed.      Plan 8/22:   -Has follow up with [facility] in 3 mon (Nov).  -Order MRI orbits in 2/2025  -Cancel SEPT MD visit.   -RTC 2/2025.     Time spent > 25 min, with more than 50% on counseling and coordinating care.     Cancer Staging   No matching staging information was found for the patient.      Oncology History    No history exists.                 Tonya Tolbert MD PhD

## 2024-08-25 DIAGNOSIS — I10 HTN (HYPERTENSION), BENIGN: ICD-10-CM

## 2024-08-25 DIAGNOSIS — E03.9 HYPOTHYROIDISM, UNSPECIFIED TYPE: ICD-10-CM

## 2024-08-26 RX ORDER — LISINOPRIL 10 MG/1
10 TABLET ORAL DAILY
Qty: 90 TABLET | Refills: 0 | Status: SHIPPED | OUTPATIENT
Start: 2024-08-26

## 2024-08-26 RX ORDER — LEVOTHYROXINE SODIUM 150 UG/1
150 TABLET ORAL DAILY
Qty: 90 TABLET | Refills: 0 | Status: SHIPPED | OUTPATIENT
Start: 2024-08-26

## 2024-09-11 DIAGNOSIS — J30.2 SEASONAL ALLERGIES: ICD-10-CM

## 2024-09-12 ENCOUNTER — APPOINTMENT (OUTPATIENT)
Dept: HEMATOLOGY/ONCOLOGY | Facility: HOSPITAL | Age: 74
End: 2024-09-12
Payer: MEDICARE

## 2024-09-16 RX ORDER — MINERAL OIL
180 ENEMA (ML) RECTAL DAILY
Qty: 30 TABLET | Refills: 0 | Status: SHIPPED | OUTPATIENT
Start: 2024-09-16

## 2024-09-23 ENCOUNTER — DOCUMENTATION (OUTPATIENT)
Dept: PRIMARY CARE | Facility: CLINIC | Age: 74
End: 2024-09-23
Payer: MEDICARE

## 2024-09-23 DIAGNOSIS — Z00.00 ANNUAL PHYSICAL EXAM: Primary | ICD-10-CM

## 2024-09-23 NOTE — PROGRESS NOTES
Pt FIT test did flag positive and result was sent us by outside lab. Attempted to call pt to go over results but no response. Message was left. Will order colonoscopy at this time.

## 2024-10-16 DIAGNOSIS — J30.2 SEASONAL ALLERGIES: ICD-10-CM

## 2024-10-16 RX ORDER — MINERAL OIL
180 ENEMA (ML) RECTAL DAILY
Qty: 30 TABLET | Refills: 0 | Status: SHIPPED | OUTPATIENT
Start: 2024-10-16

## 2024-10-28 ENCOUNTER — TELEPHONE (OUTPATIENT)
Dept: PRIMARY CARE | Facility: CLINIC | Age: 74
End: 2024-10-28
Payer: MEDICARE

## 2024-11-05 ENCOUNTER — APPOINTMENT (OUTPATIENT)
Dept: OPHTHALMOLOGY | Facility: CLINIC | Age: 74
End: 2024-11-05
Payer: MEDICARE

## 2024-11-11 ENCOUNTER — DOCUMENTATION (OUTPATIENT)
Dept: PRIMARY CARE | Facility: CLINIC | Age: 74
End: 2024-11-11
Payer: MEDICARE

## 2024-11-11 ENCOUNTER — APPOINTMENT (OUTPATIENT)
Dept: RADIATION ONCOLOGY | Facility: HOSPITAL | Age: 74
End: 2024-11-11
Payer: MEDICARE

## 2024-11-11 NOTE — PROGRESS NOTES
Did get in touch with patient about pos FIT. Pt did schedule colonoscopy and she is aware of results. Will follow up after test.

## 2024-11-13 DIAGNOSIS — J30.2 SEASONAL ALLERGIES: ICD-10-CM

## 2024-11-13 RX ORDER — MINERAL OIL
180 ENEMA (ML) RECTAL DAILY
Qty: 30 TABLET | Refills: 0 | Status: SHIPPED | OUTPATIENT
Start: 2024-11-13

## 2024-11-23 DIAGNOSIS — E03.9 HYPOTHYROIDISM, UNSPECIFIED TYPE: ICD-10-CM

## 2024-11-23 DIAGNOSIS — I10 HTN (HYPERTENSION), BENIGN: ICD-10-CM

## 2024-11-26 RX ORDER — LEVOTHYROXINE SODIUM 150 UG/1
150 TABLET ORAL DAILY
Qty: 90 TABLET | Refills: 0 | Status: SHIPPED | OUTPATIENT
Start: 2024-11-26

## 2024-11-26 RX ORDER — LISINOPRIL 10 MG/1
10 TABLET ORAL DAILY
Qty: 90 TABLET | Refills: 0 | Status: SHIPPED | OUTPATIENT
Start: 2024-11-26

## 2024-12-12 DIAGNOSIS — J30.2 SEASONAL ALLERGIES: ICD-10-CM

## 2024-12-12 RX ORDER — MINERAL OIL
180 ENEMA (ML) RECTAL DAILY
Qty: 30 TABLET | Refills: 0 | Status: SHIPPED | OUTPATIENT
Start: 2024-12-12

## 2024-12-16 ENCOUNTER — PREP FOR PROCEDURE (OUTPATIENT)
Dept: SURGERY | Facility: HOSPITAL | Age: 74
End: 2024-12-16
Payer: MEDICARE

## 2024-12-16 RX ORDER — SODIUM CHLORIDE, SODIUM LACTATE, POTASSIUM CHLORIDE, CALCIUM CHLORIDE 600; 310; 30; 20 MG/100ML; MG/100ML; MG/100ML; MG/100ML
100 INJECTION, SOLUTION INTRAVENOUS CONTINUOUS
Status: CANCELLED | OUTPATIENT
Start: 2024-12-16 | End: 2024-12-17

## 2024-12-19 ENCOUNTER — ANESTHESIA EVENT (OUTPATIENT)
Dept: GASTROENTEROLOGY | Facility: HOSPITAL | Age: 74
End: 2024-12-19
Payer: MEDICARE

## 2024-12-19 ENCOUNTER — HOSPITAL ENCOUNTER (OUTPATIENT)
Dept: GASTROENTEROLOGY | Facility: HOSPITAL | Age: 74
Discharge: HOME | End: 2024-12-19
Payer: MEDICARE

## 2024-12-19 ENCOUNTER — ANESTHESIA (OUTPATIENT)
Dept: GASTROENTEROLOGY | Facility: HOSPITAL | Age: 74
End: 2024-12-19
Payer: MEDICARE

## 2024-12-19 VITALS
TEMPERATURE: 97.9 F | BODY MASS INDEX: 34.55 KG/M2 | RESPIRATION RATE: 18 BRPM | OXYGEN SATURATION: 99 % | SYSTOLIC BLOOD PRESSURE: 140 MMHG | HEART RATE: 86 BPM | HEIGHT: 63 IN | DIASTOLIC BLOOD PRESSURE: 72 MMHG | WEIGHT: 195 LBS

## 2024-12-19 DIAGNOSIS — Z00.00 ANNUAL PHYSICAL EXAM: ICD-10-CM

## 2024-12-19 PROCEDURE — 3700000001 HC GENERAL ANESTHESIA TIME - INITIAL BASE CHARGE

## 2024-12-19 PROCEDURE — 45385 COLONOSCOPY W/LESION REMOVAL: CPT | Performed by: STUDENT IN AN ORGANIZED HEALTH CARE EDUCATION/TRAINING PROGRAM

## 2024-12-19 PROCEDURE — 2500000004 HC RX 250 GENERAL PHARMACY W/ HCPCS (ALT 636 FOR OP/ED): Performed by: NURSE ANESTHETIST, CERTIFIED REGISTERED

## 2024-12-19 PROCEDURE — 7100000010 HC PHASE TWO TIME - EACH INCREMENTAL 1 MINUTE

## 2024-12-19 PROCEDURE — 2720000007 HC OR 272 NO HCPCS

## 2024-12-19 PROCEDURE — 45381 COLONOSCOPY SUBMUCOUS NJX: CPT | Performed by: STUDENT IN AN ORGANIZED HEALTH CARE EDUCATION/TRAINING PROGRAM

## 2024-12-19 PROCEDURE — 3700000002 HC GENERAL ANESTHESIA TIME - EACH INCREMENTAL 1 MINUTE

## 2024-12-19 PROCEDURE — 7100000009 HC PHASE TWO TIME - INITIAL BASE CHARGE

## 2024-12-19 RX ORDER — MIDAZOLAM HYDROCHLORIDE 1 MG/ML
1 INJECTION, SOLUTION INTRAMUSCULAR; INTRAVENOUS ONCE AS NEEDED
OUTPATIENT
Start: 2024-12-19

## 2024-12-19 RX ORDER — FENTANYL CITRATE 50 UG/ML
25 INJECTION, SOLUTION INTRAMUSCULAR; INTRAVENOUS EVERY 5 MIN PRN
OUTPATIENT
Start: 2024-12-19

## 2024-12-19 RX ORDER — ALBUTEROL SULFATE 0.83 MG/ML
2.5 SOLUTION RESPIRATORY (INHALATION) ONCE AS NEEDED
OUTPATIENT
Start: 2024-12-19

## 2024-12-19 RX ORDER — LIDOCAINE HYDROCHLORIDE 20 MG/ML
INJECTION, SOLUTION EPIDURAL; INFILTRATION; INTRACAUDAL; PERINEURAL AS NEEDED
Status: DISCONTINUED | OUTPATIENT
Start: 2024-12-19 | End: 2024-12-19

## 2024-12-19 RX ORDER — OXYCODONE HYDROCHLORIDE 5 MG/1
5 TABLET ORAL EVERY 4 HOURS PRN
OUTPATIENT
Start: 2024-12-19

## 2024-12-19 RX ORDER — LABETALOL HYDROCHLORIDE 5 MG/ML
5 INJECTION, SOLUTION INTRAVENOUS ONCE AS NEEDED
OUTPATIENT
Start: 2024-12-19

## 2024-12-19 RX ORDER — ONDANSETRON HYDROCHLORIDE 2 MG/ML
4 INJECTION, SOLUTION INTRAVENOUS ONCE AS NEEDED
OUTPATIENT
Start: 2024-12-19

## 2024-12-19 RX ORDER — SODIUM CHLORIDE, SODIUM LACTATE, POTASSIUM CHLORIDE, CALCIUM CHLORIDE 600; 310; 30; 20 MG/100ML; MG/100ML; MG/100ML; MG/100ML
100 INJECTION, SOLUTION INTRAVENOUS CONTINUOUS
OUTPATIENT
Start: 2024-12-19 | End: 2024-12-19

## 2024-12-19 RX ORDER — PROPOFOL 10 MG/ML
INJECTION, EMULSION INTRAVENOUS AS NEEDED
Status: DISCONTINUED | OUTPATIENT
Start: 2024-12-19 | End: 2024-12-19

## 2024-12-19 RX ORDER — LIDOCAINE HYDROCHLORIDE 10 MG/ML
0.1 INJECTION, SOLUTION INFILTRATION; PERINEURAL ONCE
OUTPATIENT
Start: 2024-12-19 | End: 2024-12-19

## 2024-12-19 RX ORDER — MEPERIDINE HYDROCHLORIDE 50 MG/ML
12.5 INJECTION INTRAMUSCULAR; INTRAVENOUS; SUBCUTANEOUS EVERY 10 MIN PRN
OUTPATIENT
Start: 2024-12-19

## 2024-12-19 SDOH — HEALTH STABILITY: MENTAL HEALTH: CURRENT SMOKER: 0

## 2024-12-19 ASSESSMENT — PAIN SCALES - GENERAL
PAIN_LEVEL: 0
PAINLEVEL_OUTOF10: 0 - NO PAIN

## 2024-12-19 ASSESSMENT — COLUMBIA-SUICIDE SEVERITY RATING SCALE - C-SSRS
1. IN THE PAST MONTH, HAVE YOU WISHED YOU WERE DEAD OR WISHED YOU COULD GO TO SLEEP AND NOT WAKE UP?: NO
2. HAVE YOU ACTUALLY HAD ANY THOUGHTS OF KILLING YOURSELF?: NO
6. HAVE YOU EVER DONE ANYTHING, STARTED TO DO ANYTHING, OR PREPARED TO DO ANYTHING TO END YOUR LIFE?: NO

## 2024-12-19 ASSESSMENT — PAIN - FUNCTIONAL ASSESSMENT
PAIN_FUNCTIONAL_ASSESSMENT: 0-10

## 2024-12-19 NOTE — H&P
History Of Present Illness  Ирина Wesley is a 73 y.o. female presenting for colonoscopy. She recently had a positive FIT test ordered by her PCP and was referred for screening colonoscopy.      Past Medical History  Past Medical History:   Diagnosis Date    Essential (primary) hypertension 2022    Hypertension    Essential (primary) hypertension 2022    Hypertension    Essential (primary) hypertension 2022    Hypertension    Essential (primary) hypertension 2022    Hypertension    Hypothyroidism, unspecified 2022    Hypothyroid    Nontoxic multinodular goiter 2020    Multinodular goiter    Sensorineural hearing loss, bilateral 02/10/2022    Sensorineural hearing loss (SNHL) of both ears       Surgical History  Past Surgical History:   Procedure Laterality Date     SECTION, CLASSIC      HYSTERECTOMY      OTHER SURGICAL HISTORY  2021    Cataract surgery        Social History  She reports that she quit smoking about 11 years ago. Her smoking use included cigarettes. She has never used smokeless tobacco. She reports that she does not currently use alcohol. She reports that she does not currently use drugs after having used the following drugs: Marijuana.    Family History  Family History   Problem Relation Name Age of Onset    Cancer Father      Breast cancer Niece          Allergies  Codeine    Review of Systems   12 point ROS was negative except as mentioned in HPI.   Physical Exam     Physical Exam:   Constitutional: well appearing, resting comfortably  Eyes: EOMI  Head/Neck: NCAT  Respiratory: nonlabored breathing on room air  Cardiovascular: regular rate  Abdominal: soft, nontender, nondistended, no rebound or guarding  MSK: moves all extremities  Extremities: no lower extremity edema  Skin: warm and well perfused, no rashes  Psych: appropriate mood and behavior    Last Recorded Vitals  Blood pressure 123/71, pulse 98, temperature 36.1 °C (97 °F), temperature  "source Tympanic, resp. rate 17, height 1.6 m (5' 3\"), weight 88.5 kg (195 lb), SpO2 96%.         Assessment/Plan   Assessment & Plan  Annual physical exam      Plan for colonoscopy today, will DC post procedure.       Mendez Angulo MD    "

## 2024-12-19 NOTE — ANESTHESIA PREPROCEDURE EVALUATION
Patient: Ирина Wesley    Procedure Information       Date/Time: 24 1040    Scheduled providers: Zacarias Drake MD    Procedure: COLONOSCOPY    [place] [facility] [place]          Vitals:    24 0952   BP: 123/71   Pulse: 98   Resp: 17   Temp: 36.1 °C (97 °F)   SpO2: 96%       Past Surgical History:   Procedure Laterality Date     SECTION, CLASSIC      HYSTERECTOMY      OTHER SURGICAL HISTORY  2021    Cataract surgery     Past Medical History:   Diagnosis Date    Essential (primary) hypertension 2022    Hypertension    Essential (primary) hypertension 2022    Hypertension    Essential (primary) hypertension 2022    Hypertension    Essential (primary) hypertension 2022    Hypertension    Hypothyroidism, unspecified 2022    Hypothyroid    Nontoxic multinodular goiter 2020    Multinodular goiter    Sensorineural hearing loss, bilateral 02/10/2022    Sensorineural hearing loss (SNHL) of both ears       Current Outpatient Medications:     atorvastatin (Lipitor) 40 mg tablet, Take 1 tablet (40 mg) by mouth once daily., Disp: 100 tablet, Rfl: 3    fexofenadine (Allegra) 180 mg tablet, Take 1 tablet by mouth once daily, Disp: 30 tablet, Rfl: 0    levothyroxine (Synthroid, Levoxyl) 150 mcg tablet, Take 1 tablet by mouth once daily, Disp: 90 tablet, Rfl: 0    lisinopril 10 mg tablet, Take 1 tablet by mouth once daily, Disp: 90 tablet, Rfl: 0  Prior to Admission medications    Medication Sig Start Date End Date Taking? Authorizing Provider   atorvastatin (Lipitor) 40 mg tablet Take 1 tablet (40 mg) by mouth once daily. 24 Yes Knen Tubbs, DO   fexofenadine (Allegra) 180 mg tablet Take 1 tablet by mouth once daily 24  Yes Alicia Wang, DO   levothyroxine (Synthroid, Levoxyl) 150 mcg tablet Take 1 tablet by mouth once daily 24  Yes Kenn Tubbs, DO   lisinopril 10 mg tablet Take 1 tablet by mouth once daily 24  Yes Kenn  DO Arley     Allergies   Allergen Reactions    Codeine Unknown     Social History     Tobacco Use    Smoking status: Former     Current packs/day: 0.00     Types: Cigarettes     Quit date: 2013     Years since quittin.9    Smokeless tobacco: Never   Substance Use Topics    Alcohol use: Not Currently         Chemistry    Lab Results   Component Value Date/Time     2024 1004    K 4.2 2024 1004     2024 1004    CO2 26 2024 1004    BUN 25 (H) 2024 1004    CREATININE 0.84 2024 1004    Lab Results   Component Value Date/Time    CALCIUM 9.2 2024 1004    ALKPHOS 118 2024 1004    AST 11 2024 1004    ALT 10 2024 1004    BILITOT 0.5 2024 1004          Lab Results   Component Value Date/Time    WBC 6.8 2024 1004    HGB 14.4 2024 1004    HCT 44.3 2024 1004     2024 1004     Lab Results   Component Value Date/Time    PROTIME 11.4 2023 1622    INR 1.0 2023 1622     No results found for this or any previous visit (from the past 4464 hours).     Relevant Problems   Cardiac   (+) Hyperlipidemia   (+) Hypertension      Endocrine   (+) Hypothyroid   (+) Multinodular goiter      Hematology   (+) Marginal zone lymphoma of extranodal and solid organ sites (Multi)   (+) Orbital lymphoma (Multi)      HEENT   (+) Hearing loss   (+) Mixed conductive and sensorineural hearing loss, unilateral, right ear with restricted hearing on the contralateral side   (+) Seasonal allergies   (+) Sensorineural hearing loss (SNHL) of both ears       Clinical information reviewed:   Tobacco  Allergies  Meds  Problems  Med Hx  Surg Hx   Fam Hx          NPO Detail:  NPO/Void Status  Date of Last Liquid: 24  Time of Last Liquid: 0400  Date of Last Solid: 24         Physical Exam    Airway  Mallampati: II  TM distance: >3 FB     Cardiovascular - normal exam  Rhythm: regular  Rate: normal     Dental - normal exam      Pulmonary - normal exam     Abdominal - normal exam  Abdomen: soft             Anesthesia Plan    History of general anesthesia?: yes  History of complications of general anesthesia?: no    ASA 2     MAC     The patient is not a current smoker.  Patient was previously instructed to abstain from smoking on day of procedure.  Patient did not smoke on day of procedure.  Education provided regarding risk of obstructive sleep apnea.  intravenous induction   Postoperative administration of opioids is intended.  Anesthetic plan and risks discussed with patient.  Use of blood products discussed with patient who.    Plan discussed with CRNA.

## 2024-12-19 NOTE — ANESTHESIA POSTPROCEDURE EVALUATION
Patient: Ирина Wesley    Procedure Summary       Date: 12/19/24 Room / Location: [facility] [place]    Anesthesia Start: 1138 Anesthesia Stop: 1226    Procedure: COLONOSCOPY Diagnosis: Annual physical exam    Scheduled Providers: Zacarias Drake MD Responsible Provider: Jabier Reed DO    Anesthesia Type: general ASA Status: 2            Anesthesia Type: general    Vitals Value Taken Time   /64 12/19/24 1228   Temp 36.6 12/19/24 1228   Pulse 94 12/19/24 1228   Resp 14 12/19/24 1228   SpO2 95 12/19/24 1228       Anesthesia Post Evaluation    Patient location during evaluation: PACU  Patient participation: complete - patient participated  Level of consciousness: sleepy but conscious and responsive to verbal stimuli  Pain score: 0  Pain management: adequate  Airway patency: patent  Cardiovascular status: acceptable and hemodynamically stable  Respiratory status: acceptable, nonlabored ventilation, room air, unassisted and spontaneous ventilation  Hydration status: acceptable  Postoperative Nausea and Vomiting: none  Comments: Handoff to PACU RN in phase 2        There were no known notable events for this encounter.

## 2024-12-26 LAB
LABORATORY COMMENT REPORT: NORMAL
PATH REPORT.FINAL DX SPEC: NORMAL
PATH REPORT.GROSS SPEC: NORMAL
PATH REPORT.RELEVANT HX SPEC: NORMAL
PATH REPORT.TOTAL CANCER: NORMAL

## 2024-12-30 ENCOUNTER — APPOINTMENT (OUTPATIENT)
Dept: RADIATION ONCOLOGY | Facility: HOSPITAL | Age: 74
End: 2024-12-30
Payer: MEDICARE

## 2025-01-15 ENCOUNTER — APPOINTMENT (OUTPATIENT)
Dept: RADIATION ONCOLOGY | Facility: HOSPITAL | Age: 75
End: 2025-01-15
Payer: MEDICARE

## 2025-01-15 DIAGNOSIS — J30.2 SEASONAL ALLERGIES: ICD-10-CM

## 2025-01-16 ENCOUNTER — OFFICE VISIT (OUTPATIENT)
Dept: SURGERY | Facility: CLINIC | Age: 75
End: 2025-01-16
Payer: MEDICARE

## 2025-01-16 VITALS
SYSTOLIC BLOOD PRESSURE: 146 MMHG | DIASTOLIC BLOOD PRESSURE: 81 MMHG | HEART RATE: 86 BPM | BODY MASS INDEX: 34.02 KG/M2 | TEMPERATURE: 97.3 F | HEIGHT: 63 IN | WEIGHT: 192 LBS

## 2025-01-16 DIAGNOSIS — K51.40 PSEUDOPOLYPOSIS OF COLON, UNSPECIFIED COMPLICATION STATUS, UNSPECIFIED PART OF COLON (MULTI): Primary | ICD-10-CM

## 2025-01-16 PROCEDURE — 1159F MED LIST DOCD IN RCRD: CPT | Performed by: NURSE PRACTITIONER

## 2025-01-16 PROCEDURE — 3077F SYST BP >= 140 MM HG: CPT | Performed by: NURSE PRACTITIONER

## 2025-01-16 PROCEDURE — 99212 OFFICE O/P EST SF 10 MIN: CPT | Performed by: NURSE PRACTITIONER

## 2025-01-16 PROCEDURE — 1036F TOBACCO NON-USER: CPT | Performed by: NURSE PRACTITIONER

## 2025-01-16 PROCEDURE — 3079F DIAST BP 80-89 MM HG: CPT | Performed by: NURSE PRACTITIONER

## 2025-01-16 RX ORDER — BISMUTH SUBSALICYLATE 262 MG
1 TABLET,CHEWABLE ORAL DAILY
COMMUNITY

## 2025-01-16 NOTE — PROGRESS NOTES
History Of Present Illness  Ирина Wesley is a 74 y.o. female who had her first Colonoscopy 24 by Dr. Drake and is here for a follow up visit.    Pathology:  . COLON, ASCENDING, POLYP #1, POLYPECTOMY:  REACTIVE COLONIC MUCOSA WITH FOCAL CRYPTITIS.     B. COLON, ASCENDING, POLYP #2, POLYPECTOMY:  INFLAMMATORY POLYP.     C. COLON, TRANSVERSE, POLYP, POLYPECTOMY:  INFLAMMATORY / GRANULATION TISSUE POLYP.  Past Medical History    She is having no issues with her BM's and will have 1 soft BM every day to every other day.  No c/o any rectal bleeding.  No c/o any abdominal or anal pain.  She does not take any fiber supplements or colace.        She has a past medical history of Essential (primary) hypertension (2022), Essential (primary) hypertension (2022), Essential (primary) hypertension (2022), Essential (primary) hypertension (2022), Hypothyroidism, unspecified (2022), Nontoxic multinodular goiter (2020), and Sensorineural hearing loss, bilateral (02/10/2022).    Surgical History  She has a past surgical history that includes Other surgical history (2021); Hysterectomy; and  section, classic.     Social History  She reports that she quit smoking about 12 years ago. Her smoking use included cigarettes. She has never used smokeless tobacco. She reports that she does not currently use alcohol. She reports that she does not currently use drugs after having used the following drugs: Marijuana.    Family History  Family History   Problem Relation Name Age of Onset    Cancer Father      Breast cancer Niece          Allergies  Codeine    Review of Systems   All other systems reviewed and are negative.       Physical Exam  Vitals reviewed. Exam conducted with a chaperone present.   HENT:      Head: Normocephalic.   Cardiovascular:      Rate and Rhythm: Normal rate and regular rhythm.   Pulmonary:      Effort: Pulmonary effort is normal.      Breath sounds: Normal breath  sounds.   Abdominal:      General: Abdomen is flat. Bowel sounds are normal.      Palpations: Abdomen is soft.   Genitourinary:     Rectum: Normal.   Musculoskeletal:         General: Normal range of motion.   Skin:     General: Skin is warm and dry.   Neurological:      General: No focal deficit present.   Psychiatric:         Mood and Affect: Mood normal.         Behavior: Behavior normal.         Thought Content: Thought content normal.         Judgment: Judgment normal.          Assessment/Plan   Ирина has done well since her colonoscopy.  She will continue to increase her fiber and water intake to keep her stools soft.  She will call with any issues and will follow up on an as needed basis.         Concetta Romo, APRN-CNP

## 2025-01-21 RX ORDER — MINERAL OIL
180 ENEMA (ML) RECTAL DAILY
Qty: 30 TABLET | Refills: 0 | Status: SHIPPED | OUTPATIENT
Start: 2025-01-21

## 2025-02-15 DIAGNOSIS — J30.2 SEASONAL ALLERGIES: ICD-10-CM

## 2025-02-17 ENCOUNTER — HOSPITAL ENCOUNTER (OUTPATIENT)
Dept: RADIOLOGY | Facility: HOSPITAL | Age: 75
Discharge: HOME | End: 2025-02-17
Payer: MEDICARE

## 2025-02-17 DIAGNOSIS — C85.89 MARGINAL ZONE LYMPHOMA OF EXTRANODAL AND SOLID ORGAN SITES (MULTI): ICD-10-CM

## 2025-02-17 PROCEDURE — 2550000001 HC RX 255 CONTRASTS: Performed by: INTERNAL MEDICINE

## 2025-02-17 PROCEDURE — A9575 INJ GADOTERATE MEGLUMI 0.1ML: HCPCS | Performed by: INTERNAL MEDICINE

## 2025-02-17 PROCEDURE — 70543 MRI ORBT/FAC/NCK W/O &W/DYE: CPT | Performed by: RADIOLOGY

## 2025-02-17 PROCEDURE — 70543 MRI ORBT/FAC/NCK W/O &W/DYE: CPT

## 2025-02-17 RX ORDER — GADOTERATE MEGLUMINE 376.9 MG/ML
18 INJECTION INTRAVENOUS
Status: COMPLETED | OUTPATIENT
Start: 2025-02-17 | End: 2025-02-17

## 2025-02-17 RX ORDER — MINERAL OIL
180 ENEMA (ML) RECTAL DAILY
Qty: 30 TABLET | Refills: 0 | Status: SHIPPED | OUTPATIENT
Start: 2025-02-17

## 2025-02-17 RX ADMIN — GADOTERATE MEGLUMINE 18 ML: 376.9 INJECTION INTRAVENOUS at 11:04

## 2025-02-19 DIAGNOSIS — E03.9 HYPOTHYROIDISM, UNSPECIFIED TYPE: ICD-10-CM

## 2025-02-19 DIAGNOSIS — I10 HTN (HYPERTENSION), BENIGN: ICD-10-CM

## 2025-02-19 RX ORDER — LEVOTHYROXINE SODIUM 150 UG/1
150 TABLET ORAL DAILY
Qty: 90 TABLET | Refills: 0 | Status: SHIPPED | OUTPATIENT
Start: 2025-02-19

## 2025-02-19 RX ORDER — LISINOPRIL 10 MG/1
10 TABLET ORAL DAILY
Qty: 90 TABLET | Refills: 0 | Status: SHIPPED | OUTPATIENT
Start: 2025-02-19

## 2025-02-20 ENCOUNTER — OFFICE VISIT (OUTPATIENT)
Dept: HEMATOLOGY/ONCOLOGY | Facility: HOSPITAL | Age: 75
End: 2025-02-20
Payer: MEDICARE

## 2025-02-20 ENCOUNTER — LAB (OUTPATIENT)
Dept: LAB | Facility: HOSPITAL | Age: 75
End: 2025-02-20
Payer: MEDICARE

## 2025-02-20 VITALS
WEIGHT: 193.4 LBS | TEMPERATURE: 97.7 F | DIASTOLIC BLOOD PRESSURE: 78 MMHG | HEART RATE: 51 BPM | OXYGEN SATURATION: 94 % | BODY MASS INDEX: 34.26 KG/M2 | SYSTOLIC BLOOD PRESSURE: 141 MMHG

## 2025-02-20 DIAGNOSIS — C85.99 ORBITAL LYMPHOMA (MULTI): Primary | ICD-10-CM

## 2025-02-20 DIAGNOSIS — C85.89 MARGINAL ZONE LYMPHOMA OF EXTRANODAL AND SOLID ORGAN SITES (MULTI): ICD-10-CM

## 2025-02-20 LAB
ALBUMIN SERPL BCP-MCNC: 4.5 G/DL (ref 3.4–5)
ALP SERPL-CCNC: 111 U/L (ref 33–136)
ALT SERPL W P-5'-P-CCNC: 14 U/L (ref 7–45)
ANION GAP SERPL CALC-SCNC: 11 MMOL/L (ref 10–20)
AST SERPL W P-5'-P-CCNC: 17 U/L (ref 9–39)
BASOPHILS # BLD AUTO: 0.07 X10*3/UL (ref 0–0.1)
BASOPHILS NFR BLD AUTO: 1 %
BILIRUB SERPL-MCNC: 0.6 MG/DL (ref 0–1.2)
BUN SERPL-MCNC: 28 MG/DL (ref 6–23)
CALCIUM SERPL-MCNC: 9.7 MG/DL (ref 8.6–10.6)
CHLORIDE SERPL-SCNC: 104 MMOL/L (ref 98–107)
CO2 SERPL-SCNC: 30 MMOL/L (ref 21–32)
CREAT SERPL-MCNC: 0.94 MG/DL (ref 0.5–1.05)
EGFRCR SERPLBLD CKD-EPI 2021: 63 ML/MIN/1.73M*2
EOSINOPHIL # BLD AUTO: 0.4 X10*3/UL (ref 0–0.4)
EOSINOPHIL NFR BLD AUTO: 5.5 %
ERYTHROCYTE [DISTWIDTH] IN BLOOD BY AUTOMATED COUNT: 14.6 % (ref 11.5–14.5)
GLUCOSE SERPL-MCNC: 84 MG/DL (ref 74–99)
HCT VFR BLD AUTO: 46.6 % (ref 36–46)
HGB BLD-MCNC: 15.2 G/DL (ref 12–16)
IMM GRANULOCYTES # BLD AUTO: 0.02 X10*3/UL (ref 0–0.5)
IMM GRANULOCYTES NFR BLD AUTO: 0.3 % (ref 0–0.9)
LDH SERPL L TO P-CCNC: 140 U/L (ref 84–246)
LYMPHOCYTES # BLD AUTO: 1.61 X10*3/UL (ref 0.8–3)
LYMPHOCYTES NFR BLD AUTO: 22.1 %
MCH RBC QN AUTO: 28 PG (ref 26–34)
MCHC RBC AUTO-ENTMCNC: 32.6 G/DL (ref 32–36)
MCV RBC AUTO: 86 FL (ref 80–100)
MONOCYTES # BLD AUTO: 0.62 X10*3/UL (ref 0.05–0.8)
MONOCYTES NFR BLD AUTO: 8.5 %
NEUTROPHILS # BLD AUTO: 4.55 X10*3/UL (ref 1.6–5.5)
NEUTROPHILS NFR BLD AUTO: 62.6 %
NRBC BLD-RTO: 0 /100 WBCS (ref 0–0)
PLATELET # BLD AUTO: 227 X10*3/UL (ref 150–450)
POTASSIUM SERPL-SCNC: 4.2 MMOL/L (ref 3.5–5.3)
PROT SERPL-MCNC: 8.1 G/DL (ref 6.4–8.2)
RBC # BLD AUTO: 5.42 X10*6/UL (ref 4–5.2)
SODIUM SERPL-SCNC: 141 MMOL/L (ref 136–145)
WBC # BLD AUTO: 7.3 X10*3/UL (ref 4.4–11.3)

## 2025-02-20 PROCEDURE — 3077F SYST BP >= 140 MM HG: CPT | Performed by: INTERNAL MEDICINE

## 2025-02-20 PROCEDURE — 99214 OFFICE O/P EST MOD 30 MIN: CPT | Performed by: INTERNAL MEDICINE

## 2025-02-20 PROCEDURE — 3078F DIAST BP <80 MM HG: CPT | Performed by: INTERNAL MEDICINE

## 2025-02-20 PROCEDURE — 36415 COLL VENOUS BLD VENIPUNCTURE: CPT

## 2025-02-20 PROCEDURE — 85025 COMPLETE CBC W/AUTO DIFF WBC: CPT

## 2025-02-20 PROCEDURE — 80053 COMPREHEN METABOLIC PANEL: CPT

## 2025-02-20 PROCEDURE — 1126F AMNT PAIN NOTED NONE PRSNT: CPT | Performed by: INTERNAL MEDICINE

## 2025-02-20 PROCEDURE — 83615 LACTATE (LD) (LDH) ENZYME: CPT

## 2025-02-20 ASSESSMENT — PAIN SCALES - GENERAL: PAINLEVEL_OUTOF10: 0-NO PAIN

## 2025-02-20 NOTE — PROGRESS NOTES
"Patient ID: Ирина Wesley is a 74 y.o. female.    Subjective    Patient had no prior history of visual disturbance. In Nov 2022 she work up with swelling in the left eye. By December  the symptoms worsened, to the degree her vision in the left eye decreased significantly, and she could not move the left eye. She presented to ED on DEC 22, 2022. After MRI demonstrated \"evidence of extensive abnormal signal and enhancement infiltrating  the postseptal intraconal and extraconal left orbit\" she underwent a biopsy with Dr. Jackson Silva on DEC 24, 2022. In addition, after the biopsy she started prednisone 60 mg daily, resulting in symptomatic relief by day 3. While awaiting the pathology  results, her prednisone dose was tapered to 40 mg, then more recently to 20 mg daily. At 20 mg dose of prednisone the patient noticed the left eye symptoms (drooping, bulging) recurred to a mild degree.      The patient was seen by me on Jan 27, 2023 for Lymphoma, extranodal marginal zone lymphoma, involving the left orbits. Since then she has had more tests, including  MRI, labs, LP. In addition, she was referred to [facility] with Dr. Marbin Webster. While XRT is still pending, the prednisone dose was  increased back to 60 mg, which again improved the double vision of the left eye and swelling. XRT was initiated on 3/2/2023 daily, and finished on 3/17/2023. She feels well with both eye. Prednisone was tapered since then to 10 mg daily, and the last  dose was on 3/31/2023. She subsequently was admitted on 4/21 to receive high dose MTX + ritux, and tolerated it well. subsequently, she received C2 on 5/12/2023 and C3 on 6/2/2023. She had MRI brain or orbits in June, September, December 2023 showing no active disease.     8/22/2024 Today the patient feels well. Previously gained wt while on chemo, now losing it and feels well. No symptoms in the eyes. She has no headache. However, she experienced dizziness when changing positions, and this " has also improved since completing chemo. No cough. NO fever. No nausea vomiting diarrhea. Urine output as usual.    Treatment Synopsis:   XRT both orbits 3/2/2023 - 3/17/2023.  Prednisone 20-60 mg 12/2022-3/2023, completed tapering on 3/31/2023.  R-MTX C1 on 4/21/2023, C2 on 5/12/2023, C3 on 6/2/2023                 Objective    BSA: There is no height or weight on file to calculate BSA.  /78 (BP Location: Left arm, Patient Position: Sitting, BP Cuff Size: Adult)   Pulse 51   Temp 36.5 °C (97.7 °F) (Temporal)   LMP  (LMP Unknown)   SpO2 94%      Physical Exam  Constitutional:       General: She is not in acute distress.     Appearance: She is not toxic-appearing.   HENT:      Head: Normocephalic.      Nose: Nose normal.      Mouth/Throat:      Mouth: Mucous membranes are moist.   Eyes:      Extraocular Movements: Extraocular movements intact.      Pupils: Pupils are equal, round, and reactive to light.   Cardiovascular:      Rate and Rhythm: Normal rate and regular rhythm.      Heart sounds: No murmur heard.  Pulmonary:      Effort: Pulmonary effort is normal.      Breath sounds: Normal breath sounds.   Abdominal:      General: Bowel sounds are normal.      Palpations: Abdomen is soft. There is no mass.      Tenderness: There is no abdominal tenderness. There is no rebound.   Musculoskeletal:         General: No swelling, tenderness, deformity or signs of injury.      Right lower leg: No edema.      Left lower leg: No edema.   Skin:     Coloration: Skin is not jaundiced.      Findings: No bruising, lesion or rash.   Neurological:      Mental Status: She is alert and oriented to person, place, and time.      Cranial Nerves: No cranial nerve deficit.      Motor: No weakness.      Gait: Gait normal.   Psychiatric:         Mood and Affect: Mood normal.         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 Reviewed independently with Neuro-radiology. Tumor involvement  around the orbital space is quite extensive. The dura is possible also related. There is therefore a risk of CNS involvement / relapse. The stage is incomplete, and will require more body CT, spine MRI, bone marrow biopsy, and CSF analysis. However, these  results may be compromised by the treatment using steroid since end of Dec 2022.   The immediate plan is to prevent loss of vision in the left eye. In light of mild relapse of symptoms in the left eye, will increase prednisone back to 60 mg, while awaiting [facility] input on XRT of both orbits.   Other staging work up include: body CT, spine MRI, bone marrow biopsy, and CSF analysis. The results  do not show evidence of CSF or leptomeningeal involvement. MRI of the spine suggest possible bone marrow involvement. Bone marrow biopsy did not show evidence of lymphoma     -Systemic therapy is indicated due to significant involvement of the both orbits and surrounding tissues. Details described below.   The response to XRT and R-MTX has been encouraging. Results of Persistent but decreased soft tissue thickening and enhancement in the left orbit and surrounding tissues is likely indicative  of treatment response, and reaction and repair. However, persistent lymphoma cannot be ruled out. Unfortunately, neither MRI and PET/CT will be very sensitive to detect it. Will need to monitor closely, will likely have MRI orbits q3mon for the first 12 months.      -Results of MRI orbits shows: Findings consistent with left orbital lymphoma are slightly decreased from the prior study dated 06/26/2023. Question focal increased signal within the intracanalicular segment of the left optic nerve versus volume averaging. Attention on follow-up exams.  -Review latest MRI results in 3/2024:   -The above is more consistent with  post-treatment effects. Will continue to monitor closely. MRI brain in 3 mon, then MRI orbits in another 3 mon since June 2023 to March 2024. So far no evidence of relapse. Will transition to MRI q6mon, next MRI brain due 9/2024.   -8/22/2024 reviewed the MRI orbit results. The abnormal signals remain stable, and likely represent post-treatment effects.      #Treatment   Will benefit from high dose methotrexate and rituximab. The goal is to prevent CNS relapse and local (orbital) relapse.   Discussed the AEs of both drugs, which are numerous. In particular, discussed SAEs such as PML due to IRAIS viral encephalitis, mutositis, TAI, or even rarely death.   Patient understood the benefits and risks of the treatment and consented.   She is non-reactive to HBV core Ab. Therefore the risk of HBV reactivation is low.    She subsequently was admitted on 4/21 and 5/12 to receive high dose MTX + ritux, and tolerated both cycles well. C3 on 6/2 is the last planned chemo. Treatment is completed.      Plan 2/20/2025  -MRI of orbits in 8/2025  -CT CAP 8/2025  -RTC with [facility] after scans.      8/22:   -[facility]  -Order MRI orbits in 2/2025  -Cancel SEPT MD visit.   -RTC 2/2025.     Time spent > 25 min, with more than 50% on counseling and coordinating care.     Cancer Staging   No matching staging information was found for the patient.      Oncology History    No history exists.                 Tonya Tolbert MD PhD

## 2025-03-17 DIAGNOSIS — J30.2 SEASONAL ALLERGIES: ICD-10-CM

## 2025-03-17 RX ORDER — MINERAL OIL
180 ENEMA (ML) RECTAL DAILY
Qty: 30 TABLET | Refills: 0 | Status: SHIPPED | OUTPATIENT
Start: 2025-03-17

## 2025-04-14 DIAGNOSIS — J30.2 SEASONAL ALLERGIES: ICD-10-CM

## 2025-04-14 RX ORDER — FEXOFENADINE HCL 180 MG/1
180 TABLET ORAL DAILY
Qty: 30 TABLET | Refills: 0 | Status: SHIPPED | OUTPATIENT
Start: 2025-04-14

## 2025-05-14 DIAGNOSIS — J30.2 SEASONAL ALLERGIES: ICD-10-CM

## 2025-05-15 RX ORDER — FEXOFENADINE HCL 180 MG/1
180 TABLET ORAL DAILY
Qty: 90 TABLET | Refills: 3 | Status: SHIPPED | OUTPATIENT
Start: 2025-05-15 | End: 2026-05-15

## 2025-05-16 DIAGNOSIS — I10 HTN (HYPERTENSION), BENIGN: ICD-10-CM

## 2025-05-16 DIAGNOSIS — E03.9 HYPOTHYROIDISM, UNSPECIFIED TYPE: ICD-10-CM

## 2025-05-16 RX ORDER — LEVOTHYROXINE SODIUM 150 UG/1
150 TABLET ORAL DAILY
Qty: 90 TABLET | Refills: 0 | Status: SHIPPED | OUTPATIENT
Start: 2025-05-16

## 2025-05-16 RX ORDER — LISINOPRIL 10 MG/1
10 TABLET ORAL DAILY
Qty: 90 TABLET | Refills: 0 | Status: SHIPPED | OUTPATIENT
Start: 2025-05-16

## 2025-07-16 DIAGNOSIS — E78.5 HYPERLIPIDEMIA, UNSPECIFIED HYPERLIPIDEMIA TYPE: ICD-10-CM

## 2025-07-16 RX ORDER — ATORVASTATIN CALCIUM 40 MG/1
40 TABLET, FILM COATED ORAL DAILY
Qty: 90 TABLET | Refills: 1 | Status: SHIPPED | OUTPATIENT
Start: 2025-07-16 | End: 2026-01-12

## 2025-08-13 DIAGNOSIS — E03.9 HYPOTHYROIDISM, UNSPECIFIED TYPE: ICD-10-CM

## 2025-08-13 DIAGNOSIS — I10 HTN (HYPERTENSION), BENIGN: ICD-10-CM

## 2025-08-14 RX ORDER — LISINOPRIL 10 MG/1
10 TABLET ORAL DAILY
Qty: 90 TABLET | Refills: 0 | Status: SHIPPED | OUTPATIENT
Start: 2025-08-14

## 2025-08-14 RX ORDER — LEVOTHYROXINE SODIUM 150 UG/1
150 TABLET ORAL DAILY
Qty: 90 TABLET | Refills: 0 | Status: SHIPPED | OUTPATIENT
Start: 2025-08-14

## 2025-08-18 ENCOUNTER — HOSPITAL ENCOUNTER (OUTPATIENT)
Dept: RADIOLOGY | Facility: HOSPITAL | Age: 75
Discharge: HOME | End: 2025-08-18
Payer: MEDICARE

## 2025-08-18 DIAGNOSIS — C85.89 MARGINAL ZONE LYMPHOMA OF EXTRANODAL AND SOLID ORGAN SITES (MULTI): ICD-10-CM

## 2025-08-18 LAB
CREAT SERPL-MCNC: 1 MG/DL (ref 0.6–1.3)
EGFRCR SERPLBLD CKD-EPI 2021: 59 ML/MIN/1.73M*2

## 2025-08-18 PROCEDURE — 82565 ASSAY OF CREATININE: CPT

## 2025-08-18 PROCEDURE — A9575 INJ GADOTERATE MEGLUMI 0.1ML: HCPCS | Mod: JW

## 2025-08-18 PROCEDURE — 74177 CT ABD & PELVIS W/CONTRAST: CPT | Performed by: STUDENT IN AN ORGANIZED HEALTH CARE EDUCATION/TRAINING PROGRAM

## 2025-08-18 PROCEDURE — 2550000001 HC RX 255 CONTRASTS

## 2025-08-18 PROCEDURE — 74177 CT ABD & PELVIS W/CONTRAST: CPT

## 2025-08-18 PROCEDURE — 70543 MRI ORBT/FAC/NCK W/O &W/DYE: CPT

## 2025-08-18 PROCEDURE — 2550000001 HC RX 255 CONTRASTS: Mod: JW

## 2025-08-18 PROCEDURE — 71260 CT THORAX DX C+: CPT | Performed by: STUDENT IN AN ORGANIZED HEALTH CARE EDUCATION/TRAINING PROGRAM

## 2025-08-18 PROCEDURE — 70543 MRI ORBT/FAC/NCK W/O &W/DYE: CPT | Performed by: RADIOLOGY

## 2025-08-18 RX ORDER — GADOTERATE MEGLUMINE 376.9 MG/ML
18 INJECTION INTRAVENOUS
Status: COMPLETED | OUTPATIENT
Start: 2025-08-18 | End: 2025-08-18

## 2025-08-18 RX ADMIN — IOHEXOL 75 ML: 350 INJECTION, SOLUTION INTRAVENOUS at 16:36

## 2025-08-18 RX ADMIN — GADOTERATE MEGLUMINE 18 ML: 376.9 INJECTION INTRAVENOUS at 16:20

## 2025-08-22 ENCOUNTER — OFFICE VISIT (OUTPATIENT)
Dept: HEMATOLOGY/ONCOLOGY | Facility: HOSPITAL | Age: 75
End: 2025-08-22
Payer: MEDICARE

## 2025-08-22 ENCOUNTER — LAB (OUTPATIENT)
Dept: LAB | Facility: HOSPITAL | Age: 75
End: 2025-08-22
Payer: MEDICARE

## 2025-08-22 VITALS
HEART RATE: 80 BPM | BODY MASS INDEX: 36.37 KG/M2 | SYSTOLIC BLOOD PRESSURE: 149 MMHG | TEMPERATURE: 96.1 F | OXYGEN SATURATION: 99 % | WEIGHT: 205.3 LBS | DIASTOLIC BLOOD PRESSURE: 70 MMHG | RESPIRATION RATE: 14 BRPM

## 2025-08-22 DIAGNOSIS — R29.6 FALLS: ICD-10-CM

## 2025-08-22 DIAGNOSIS — C85.99 ORBITAL LYMPHOMA (MULTI): ICD-10-CM

## 2025-08-22 DIAGNOSIS — C85.89 MARGINAL ZONE LYMPHOMA OF EXTRANODAL AND SOLID ORGAN SITES (MULTI): Primary | ICD-10-CM

## 2025-08-22 DIAGNOSIS — H69.91 DYSFUNCTION OF RIGHT EUSTACHIAN TUBE: ICD-10-CM

## 2025-08-22 LAB
ALBUMIN SERPL BCP-MCNC: 4.5 G/DL (ref 3.4–5)
ALP SERPL-CCNC: 108 U/L (ref 33–136)
ALT SERPL W P-5'-P-CCNC: 17 U/L (ref 7–45)
ANION GAP SERPL CALC-SCNC: 13 MMOL/L (ref 10–20)
AST SERPL W P-5'-P-CCNC: 15 U/L (ref 9–39)
BASOPHILS # BLD AUTO: 0.06 X10*3/UL (ref 0–0.1)
BASOPHILS NFR BLD AUTO: 0.9 %
BILIRUB SERPL-MCNC: 0.7 MG/DL (ref 0–1.2)
BUN SERPL-MCNC: 17 MG/DL (ref 6–23)
CALCIUM SERPL-MCNC: 10 MG/DL (ref 8.6–10.3)
CHLORIDE SERPL-SCNC: 106 MMOL/L (ref 98–107)
CO2 SERPL-SCNC: 28 MMOL/L (ref 21–32)
CREAT SERPL-MCNC: 0.94 MG/DL (ref 0.5–1.05)
EGFRCR SERPLBLD CKD-EPI 2021: 63 ML/MIN/1.73M*2
EOSINOPHIL # BLD AUTO: 0.32 X10*3/UL (ref 0–0.4)
EOSINOPHIL NFR BLD AUTO: 4.7 %
ERYTHROCYTE [DISTWIDTH] IN BLOOD BY AUTOMATED COUNT: 13.5 % (ref 11.5–14.5)
GLUCOSE SERPL-MCNC: 96 MG/DL (ref 74–99)
HCT VFR BLD AUTO: 44.2 % (ref 36–46)
HGB BLD-MCNC: 14.4 G/DL (ref 12–16)
IMM GRANULOCYTES # BLD AUTO: 0.03 X10*3/UL (ref 0–0.5)
IMM GRANULOCYTES NFR BLD AUTO: 0.4 % (ref 0–0.9)
LDH SERPL L TO P-CCNC: 135 U/L (ref 84–246)
LYMPHOCYTES # BLD AUTO: 1.57 X10*3/UL (ref 0.8–3)
LYMPHOCYTES NFR BLD AUTO: 23 %
MCH RBC QN AUTO: 28.8 PG (ref 26–34)
MCHC RBC AUTO-ENTMCNC: 32.6 G/DL (ref 32–36)
MCV RBC AUTO: 88 FL (ref 80–100)
MONOCYTES # BLD AUTO: 0.58 X10*3/UL (ref 0.05–0.8)
MONOCYTES NFR BLD AUTO: 8.5 %
NEUTROPHILS # BLD AUTO: 4.26 X10*3/UL (ref 1.6–5.5)
NEUTROPHILS NFR BLD AUTO: 62.5 %
NRBC BLD-RTO: 0 /100 WBCS (ref 0–0)
PLATELET # BLD AUTO: 226 X10*3/UL (ref 150–450)
POTASSIUM SERPL-SCNC: 4.5 MMOL/L (ref 3.5–5.3)
PROT SERPL-MCNC: 7.4 G/DL (ref 6.4–8.2)
RBC # BLD AUTO: 5 X10*6/UL (ref 4–5.2)
SODIUM SERPL-SCNC: 142 MMOL/L (ref 136–145)
WBC # BLD AUTO: 6.8 X10*3/UL (ref 4.4–11.3)

## 2025-08-22 PROCEDURE — 1126F AMNT PAIN NOTED NONE PRSNT: CPT

## 2025-08-22 PROCEDURE — 3077F SYST BP >= 140 MM HG: CPT

## 2025-08-22 PROCEDURE — 36415 COLL VENOUS BLD VENIPUNCTURE: CPT

## 2025-08-22 PROCEDURE — 80053 COMPREHEN METABOLIC PANEL: CPT

## 2025-08-22 PROCEDURE — 99214 OFFICE O/P EST MOD 30 MIN: CPT

## 2025-08-22 PROCEDURE — 3078F DIAST BP <80 MM HG: CPT

## 2025-08-22 PROCEDURE — 85025 COMPLETE CBC W/AUTO DIFF WBC: CPT

## 2025-08-22 PROCEDURE — 83615 LACTATE (LD) (LDH) ENZYME: CPT

## 2025-08-22 ASSESSMENT — PAIN SCALES - GENERAL: PAINLEVEL_OUTOF10: 0-NO PAIN
